# Patient Record
Sex: FEMALE | URBAN - METROPOLITAN AREA
[De-identification: names, ages, dates, MRNs, and addresses within clinical notes are randomized per-mention and may not be internally consistent; named-entity substitution may affect disease eponyms.]

---

## 2020-09-08 ASSESSMENT — MIFFLIN-ST. JEOR: SCORE: 1734.61

## 2020-09-09 ENCOUNTER — SURGERY - HEALTHEAST (OUTPATIENT)
Dept: SURGERY | Facility: HOSPITAL | Age: 75
End: 2020-09-09

## 2020-09-09 ENCOUNTER — ANESTHESIA - HEALTHEAST (OUTPATIENT)
Dept: SURGERY | Facility: HOSPITAL | Age: 75
End: 2020-09-09

## 2020-09-10 ASSESSMENT — MIFFLIN-ST. JEOR
SCORE: 2025.37
SCORE: 2025.37

## 2020-09-11 ENCOUNTER — COMMUNICATION - HEALTHEAST (OUTPATIENT)
Dept: SCHEDULING | Facility: CLINIC | Age: 75
End: 2020-09-11

## 2020-09-13 ASSESSMENT — MIFFLIN-ST. JEOR: SCORE: 1848.01

## 2020-09-14 ASSESSMENT — MIFFLIN-ST. JEOR: SCORE: 1832.59

## 2020-09-15 ENCOUNTER — OFFICE VISIT - HEALTHEAST (OUTPATIENT)
Dept: GERIATRICS | Facility: CLINIC | Age: 75
End: 2020-09-15

## 2020-09-15 DIAGNOSIS — I26.99 ACUTE PULMONARY EMBOLISM, UNSPECIFIED PULMONARY EMBOLISM TYPE, UNSPECIFIED WHETHER ACUTE COR PULMONALE PRESENT (H): ICD-10-CM

## 2020-09-15 DIAGNOSIS — D62 ACUTE BLOOD LOSS ANEMIA: ICD-10-CM

## 2020-09-15 DIAGNOSIS — S72.401D CLOSED FRACTURE OF DISTAL END OF RIGHT FEMUR WITH ROUTINE HEALING, UNSPECIFIED FRACTURE MORPHOLOGY, SUBSEQUENT ENCOUNTER: ICD-10-CM

## 2020-09-15 DIAGNOSIS — R52 PAIN MANAGEMENT: ICD-10-CM

## 2020-09-16 ENCOUNTER — RECORDS - HEALTHEAST (OUTPATIENT)
Dept: LAB | Facility: CLINIC | Age: 75
End: 2020-09-16

## 2020-09-16 ENCOUNTER — COMMUNICATION - HEALTHEAST (OUTPATIENT)
Dept: GERIATRICS | Facility: CLINIC | Age: 75
End: 2020-09-16

## 2020-09-17 ENCOUNTER — OFFICE VISIT - HEALTHEAST (OUTPATIENT)
Dept: GERIATRICS | Facility: CLINIC | Age: 75
End: 2020-09-17

## 2020-09-17 DIAGNOSIS — I26.99 PULMONARY EMBOLISM, UNSPECIFIED CHRONICITY, UNSPECIFIED PULMONARY EMBOLISM TYPE, UNSPECIFIED WHETHER ACUTE COR PULMONALE PRESENT (H): ICD-10-CM

## 2020-09-17 DIAGNOSIS — D50.0 BLOOD LOSS ANEMIA: ICD-10-CM

## 2020-09-17 DIAGNOSIS — R00.0 TACHYCARDIA: ICD-10-CM

## 2020-09-17 DIAGNOSIS — S72.401D CLOSED FRACTURE OF DISTAL END OF RIGHT FEMUR WITH ROUTINE HEALING, UNSPECIFIED FRACTURE MORPHOLOGY, SUBSEQUENT ENCOUNTER: ICD-10-CM

## 2020-09-17 LAB
ERYTHROCYTE [DISTWIDTH] IN BLOOD BY AUTOMATED COUNT: 16.9 % (ref 11–14.5)
HCT VFR BLD AUTO: 25.3 % (ref 35–47)
HGB BLD-MCNC: 8.1 G/DL (ref 12–16)
MCH RBC QN AUTO: 30.6 PG (ref 27–34)
MCHC RBC AUTO-ENTMCNC: 32 G/DL (ref 32–36)
MCV RBC AUTO: 96 FL (ref 80–100)
PLATELET # BLD AUTO: 461 THOU/UL (ref 140–440)
PMV BLD AUTO: 10.6 FL (ref 8.5–12.5)
RBC # BLD AUTO: 2.65 MILL/UL (ref 3.8–5.4)
WBC: 12.2 THOU/UL (ref 4–11)

## 2020-09-21 ENCOUNTER — OFFICE VISIT - HEALTHEAST (OUTPATIENT)
Dept: GERIATRICS | Facility: CLINIC | Age: 75
End: 2020-09-21

## 2020-09-21 DIAGNOSIS — D62 ACUTE BLOOD LOSS ANEMIA: ICD-10-CM

## 2020-09-21 DIAGNOSIS — I15.9 SECONDARY HYPERTENSION: ICD-10-CM

## 2020-09-21 DIAGNOSIS — S72.401D CLOSED FRACTURE OF DISTAL END OF RIGHT FEMUR WITH ROUTINE HEALING, UNSPECIFIED FRACTURE MORPHOLOGY, SUBSEQUENT ENCOUNTER: ICD-10-CM

## 2020-09-21 RX ORDER — METOPROLOL TARTRATE 25 MG/1
100 TABLET, FILM COATED ORAL 2 TIMES DAILY
Status: SHIPPED | COMMUNITY
Start: 2020-09-21

## 2020-09-22 ENCOUNTER — OFFICE VISIT - HEALTHEAST (OUTPATIENT)
Dept: GERIATRICS | Facility: CLINIC | Age: 75
End: 2020-09-22

## 2020-09-22 DIAGNOSIS — R00.0 TACHYCARDIA: ICD-10-CM

## 2020-09-22 DIAGNOSIS — D62 ACUTE BLOOD LOSS ANEMIA: ICD-10-CM

## 2020-09-22 DIAGNOSIS — I26.99 PULMONARY EMBOLISM, OTHER, UNSPECIFIED CHRONICITY, UNSPECIFIED WHETHER ACUTE COR PULMONALE PRESENT (H): ICD-10-CM

## 2020-09-22 DIAGNOSIS — S72.401D CLOSED FRACTURE OF DISTAL END OF RIGHT FEMUR WITH ROUTINE HEALING, UNSPECIFIED FRACTURE MORPHOLOGY, SUBSEQUENT ENCOUNTER: ICD-10-CM

## 2020-09-24 ENCOUNTER — OFFICE VISIT - HEALTHEAST (OUTPATIENT)
Dept: GERIATRICS | Facility: CLINIC | Age: 75
End: 2020-09-24

## 2020-09-24 DIAGNOSIS — D62 ACUTE BLOOD LOSS ANEMIA: ICD-10-CM

## 2020-09-24 DIAGNOSIS — S72.401D CLOSED FRACTURE OF DISTAL END OF RIGHT FEMUR WITH ROUTINE HEALING, UNSPECIFIED FRACTURE MORPHOLOGY, SUBSEQUENT ENCOUNTER: ICD-10-CM

## 2020-09-24 DIAGNOSIS — R00.0 TACHYCARDIA: ICD-10-CM

## 2020-09-24 DIAGNOSIS — I26.99 ACUTE PULMONARY EMBOLISM, UNSPECIFIED PULMONARY EMBOLISM TYPE, UNSPECIFIED WHETHER ACUTE COR PULMONALE PRESENT (H): ICD-10-CM

## 2020-09-29 ENCOUNTER — OFFICE VISIT - HEALTHEAST (OUTPATIENT)
Dept: GERIATRICS | Facility: CLINIC | Age: 75
End: 2020-09-29

## 2020-09-29 DIAGNOSIS — S72.401D CLOSED FRACTURE OF DISTAL END OF RIGHT FEMUR WITH ROUTINE HEALING, UNSPECIFIED FRACTURE MORPHOLOGY, SUBSEQUENT ENCOUNTER: ICD-10-CM

## 2020-09-29 DIAGNOSIS — R00.0 TACHYCARDIA: ICD-10-CM

## 2020-09-29 DIAGNOSIS — D62 ACUTE BLOOD LOSS ANEMIA: ICD-10-CM

## 2020-09-29 DIAGNOSIS — I10 ESSENTIAL HYPERTENSION: ICD-10-CM

## 2020-10-02 ENCOUNTER — OFFICE VISIT - HEALTHEAST (OUTPATIENT)
Dept: GERIATRICS | Facility: CLINIC | Age: 75
End: 2020-10-02

## 2020-10-02 DIAGNOSIS — S72.351A: ICD-10-CM

## 2020-10-02 DIAGNOSIS — I15.9 SECONDARY HYPERTENSION: ICD-10-CM

## 2020-10-02 DIAGNOSIS — R52 PAIN MANAGEMENT: ICD-10-CM

## 2020-10-02 RX ORDER — ACETAMINOPHEN 500 MG
1000 TABLET ORAL EVERY 8 HOURS PRN
Status: SHIPPED | COMMUNITY
Start: 2020-10-02

## 2020-10-02 RX ORDER — AMLODIPINE BESYLATE 2.5 MG/1
5 TABLET ORAL DAILY
Status: SHIPPED | COMMUNITY
Start: 2020-10-02

## 2020-10-05 ENCOUNTER — COMMUNICATION - HEALTHEAST (OUTPATIENT)
Dept: GERIATRICS | Facility: CLINIC | Age: 75
End: 2020-10-05

## 2020-10-06 ENCOUNTER — OFFICE VISIT - HEALTHEAST (OUTPATIENT)
Dept: GERIATRICS | Facility: CLINIC | Age: 75
End: 2020-10-06

## 2020-10-06 DIAGNOSIS — R00.0 TACHYCARDIA: ICD-10-CM

## 2020-10-06 DIAGNOSIS — D62 ACUTE BLOOD LOSS ANEMIA: ICD-10-CM

## 2020-10-06 DIAGNOSIS — I10 ESSENTIAL HYPERTENSION: ICD-10-CM

## 2020-10-06 DIAGNOSIS — S72.401D CLOSED FRACTURE OF DISTAL END OF RIGHT FEMUR WITH ROUTINE HEALING, UNSPECIFIED FRACTURE MORPHOLOGY, SUBSEQUENT ENCOUNTER: ICD-10-CM

## 2020-10-12 ENCOUNTER — RECORDS - HEALTHEAST (OUTPATIENT)
Dept: LAB | Facility: CLINIC | Age: 75
End: 2020-10-12

## 2020-10-12 ENCOUNTER — OFFICE VISIT - HEALTHEAST (OUTPATIENT)
Dept: GERIATRICS | Facility: CLINIC | Age: 75
End: 2020-10-12

## 2020-10-12 DIAGNOSIS — I15.9 SECONDARY HYPERTENSION: ICD-10-CM

## 2020-10-12 DIAGNOSIS — N93.9 VAGINAL BLEEDING: ICD-10-CM

## 2020-10-13 LAB
ALBUMIN UR-MCNC: ABNORMAL MG/DL
APPEARANCE UR: ABNORMAL
BACTERIA #/AREA URNS HPF: ABNORMAL HPF
BACTERIA SPEC CULT: NO GROWTH
BILIRUB UR QL STRIP: NEGATIVE
COLOR UR AUTO: YELLOW
ERYTHROCYTE [DISTWIDTH] IN BLOOD BY AUTOMATED COUNT: 14.8 % (ref 11–14.5)
GLUCOSE UR STRIP-MCNC: NEGATIVE MG/DL
HCT VFR BLD AUTO: 35.1 % (ref 35–47)
HGB BLD-MCNC: 11.1 G/DL (ref 12–16)
HGB UR QL STRIP: NEGATIVE
KETONES UR STRIP-MCNC: NEGATIVE MG/DL
LEUKOCYTE ESTERASE UR QL STRIP: ABNORMAL
MCH RBC QN AUTO: 30.8 PG (ref 27–34)
MCHC RBC AUTO-ENTMCNC: 31.6 G/DL (ref 32–36)
MCV RBC AUTO: 98 FL (ref 80–100)
MUCOUS THREADS #/AREA URNS LPF: ABNORMAL LPF
NITRATE UR QL: NEGATIVE
PH UR STRIP: 5.5 [PH] (ref 4.5–8)
PLATELET # BLD AUTO: 262 THOU/UL (ref 140–440)
PMV BLD AUTO: 10.6 FL (ref 8.5–12.5)
RBC # BLD AUTO: 3.6 MILL/UL (ref 3.8–5.4)
RBC #/AREA URNS AUTO: ABNORMAL HPF
SP GR UR STRIP: 1.02 (ref 1–1.03)
SQUAMOUS #/AREA URNS AUTO: ABNORMAL LPF
TRICHOMONAS #/AREA URNS HPF: ABNORMAL /[HPF]
UROBILINOGEN UR STRIP-ACNC: ABNORMAL
WBC #/AREA URNS AUTO: ABNORMAL HPF
WBC CLUMPS #/AREA URNS HPF: PRESENT /[HPF]
WBC: 6 THOU/UL (ref 4–11)

## 2020-10-15 ENCOUNTER — OFFICE VISIT - HEALTHEAST (OUTPATIENT)
Dept: GERIATRICS | Facility: CLINIC | Age: 75
End: 2020-10-15

## 2020-10-15 DIAGNOSIS — S72.401D CLOSED FRACTURE OF DISTAL END OF RIGHT FEMUR WITH ROUTINE HEALING, UNSPECIFIED FRACTURE MORPHOLOGY, SUBSEQUENT ENCOUNTER: ICD-10-CM

## 2020-10-15 DIAGNOSIS — I26.99 OTHER ACUTE PULMONARY EMBOLISM, UNSPECIFIED WHETHER ACUTE COR PULMONALE PRESENT (H): ICD-10-CM

## 2020-10-15 DIAGNOSIS — I10 ESSENTIAL HYPERTENSION: ICD-10-CM

## 2020-10-15 DIAGNOSIS — D62 ACUTE BLOOD LOSS ANEMIA: ICD-10-CM

## 2020-10-20 ENCOUNTER — OFFICE VISIT - HEALTHEAST (OUTPATIENT)
Dept: GERIATRICS | Facility: CLINIC | Age: 75
End: 2020-10-20

## 2020-10-20 DIAGNOSIS — I26.99 OTHER ACUTE PULMONARY EMBOLISM, UNSPECIFIED WHETHER ACUTE COR PULMONALE PRESENT (H): ICD-10-CM

## 2020-10-20 DIAGNOSIS — I10 ESSENTIAL HYPERTENSION: ICD-10-CM

## 2020-10-20 DIAGNOSIS — D62 ACUTE BLOOD LOSS ANEMIA: ICD-10-CM

## 2020-10-20 DIAGNOSIS — S72.401D CLOSED FRACTURE OF DISTAL END OF RIGHT FEMUR WITH ROUTINE HEALING, UNSPECIFIED FRACTURE MORPHOLOGY, SUBSEQUENT ENCOUNTER: ICD-10-CM

## 2020-10-28 ENCOUNTER — OFFICE VISIT - HEALTHEAST (OUTPATIENT)
Dept: GERIATRICS | Facility: CLINIC | Age: 75
End: 2020-10-28

## 2020-10-28 DIAGNOSIS — I15.9 SECONDARY HYPERTENSION: ICD-10-CM

## 2020-10-28 DIAGNOSIS — S72.401D CLOSED FRACTURE OF DISTAL END OF RIGHT FEMUR WITH ROUTINE HEALING, UNSPECIFIED FRACTURE MORPHOLOGY, SUBSEQUENT ENCOUNTER: ICD-10-CM

## 2020-10-28 DIAGNOSIS — R52 PAIN MANAGEMENT: ICD-10-CM

## 2020-11-10 ENCOUNTER — OFFICE VISIT - HEALTHEAST (OUTPATIENT)
Dept: GERIATRICS | Facility: CLINIC | Age: 75
End: 2020-11-10

## 2020-11-10 DIAGNOSIS — D64.9 ANEMIA, UNSPECIFIED TYPE: ICD-10-CM

## 2020-11-10 DIAGNOSIS — I26.99 PULMONARY EMBOLISM, OTHER, UNSPECIFIED CHRONICITY, UNSPECIFIED WHETHER ACUTE COR PULMONALE PRESENT (H): ICD-10-CM

## 2020-11-10 DIAGNOSIS — S72.401D CLOSED FRACTURE OF DISTAL END OF RIGHT FEMUR WITH ROUTINE HEALING, UNSPECIFIED FRACTURE MORPHOLOGY, SUBSEQUENT ENCOUNTER: ICD-10-CM

## 2020-11-10 DIAGNOSIS — I10 ESSENTIAL HYPERTENSION: ICD-10-CM

## 2020-11-12 ENCOUNTER — OFFICE VISIT - HEALTHEAST (OUTPATIENT)
Dept: GERIATRICS | Facility: CLINIC | Age: 75
End: 2020-11-12

## 2020-11-12 DIAGNOSIS — I26.99 POSTOPERATIVE PULMONARY EMBOLISM, SUBSEQUENT ENCOUNTER (H): ICD-10-CM

## 2020-11-12 DIAGNOSIS — T81.718D POSTOPERATIVE PULMONARY EMBOLISM, SUBSEQUENT ENCOUNTER (H): ICD-10-CM

## 2020-11-12 DIAGNOSIS — I10 ESSENTIAL HYPERTENSION: ICD-10-CM

## 2020-11-12 DIAGNOSIS — S72.401D CLOSED FRACTURE OF DISTAL END OF RIGHT FEMUR WITH ROUTINE HEALING, UNSPECIFIED FRACTURE MORPHOLOGY, SUBSEQUENT ENCOUNTER: ICD-10-CM

## 2020-11-12 DIAGNOSIS — D62 ACUTE BLOOD LOSS ANEMIA: ICD-10-CM

## 2020-11-18 ENCOUNTER — OFFICE VISIT - HEALTHEAST (OUTPATIENT)
Dept: GERIATRICS | Facility: CLINIC | Age: 75
End: 2020-11-18

## 2020-11-18 DIAGNOSIS — I15.9 SECONDARY HYPERTENSION: ICD-10-CM

## 2020-11-18 DIAGNOSIS — D62 ACUTE BLOOD LOSS ANEMIA: ICD-10-CM

## 2020-11-18 DIAGNOSIS — S72.401D CLOSED FRACTURE OF DISTAL END OF RIGHT FEMUR WITH ROUTINE HEALING, UNSPECIFIED FRACTURE MORPHOLOGY, SUBSEQUENT ENCOUNTER: ICD-10-CM

## 2020-11-20 ENCOUNTER — AMBULATORY - HEALTHEAST (OUTPATIENT)
Dept: GERIATRICS | Facility: CLINIC | Age: 75
End: 2020-11-20

## 2020-11-23 ENCOUNTER — COMMUNICATION - HEALTHEAST (OUTPATIENT)
Dept: SCHEDULING | Facility: CLINIC | Age: 75
End: 2020-11-23

## 2020-11-25 ENCOUNTER — RECORDS - HEALTHEAST (OUTPATIENT)
Dept: ADMINISTRATIVE | Facility: OTHER | Age: 75
End: 2020-11-25

## 2020-12-04 ENCOUNTER — AMBULATORY - HEALTHEAST (OUTPATIENT)
Dept: OTHER | Facility: CLINIC | Age: 75
End: 2020-12-04

## 2020-12-04 ENCOUNTER — DOCUMENTATION ONLY (OUTPATIENT)
Dept: OTHER | Facility: CLINIC | Age: 75
End: 2020-12-04

## 2020-12-22 ENCOUNTER — OFFICE VISIT - HEALTHEAST (OUTPATIENT)
Dept: INTERNAL MEDICINE | Facility: CLINIC | Age: 75
End: 2020-12-22

## 2020-12-22 DIAGNOSIS — I10 ESSENTIAL HYPERTENSION: ICD-10-CM

## 2020-12-22 DIAGNOSIS — S72.401D CLOSED FRACTURE OF DISTAL END OF RIGHT FEMUR WITH ROUTINE HEALING, UNSPECIFIED FRACTURE MORPHOLOGY, SUBSEQUENT ENCOUNTER: ICD-10-CM

## 2020-12-22 RX ORDER — AMLODIPINE BESYLATE 5 MG/1
5 TABLET ORAL DAILY
Qty: 90 TABLET | Refills: 1 | Status: SHIPPED | OUTPATIENT
Start: 2020-12-22

## 2020-12-22 RX ORDER — METOPROLOL TARTRATE 100 MG
100 TABLET ORAL 2 TIMES DAILY
Qty: 180 TABLET | Refills: 1 | Status: SHIPPED | OUTPATIENT
Start: 2020-12-22

## 2020-12-30 ENCOUNTER — RECORDS - HEALTHEAST (OUTPATIENT)
Dept: ADMINISTRATIVE | Facility: OTHER | Age: 75
End: 2020-12-30

## 2020-12-31 ENCOUNTER — COMMUNICATION - HEALTHEAST (OUTPATIENT)
Dept: ADMINISTRATIVE | Facility: CLINIC | Age: 75
End: 2020-12-31

## 2021-01-06 ENCOUNTER — COMMUNICATION - HEALTHEAST (OUTPATIENT)
Dept: FAMILY MEDICINE | Facility: CLINIC | Age: 76
End: 2021-01-06

## 2021-02-19 ENCOUNTER — AMBULATORY - HEALTHEAST (OUTPATIENT)
Dept: NURSING | Facility: CLINIC | Age: 76
End: 2021-02-19

## 2021-03-12 ENCOUNTER — AMBULATORY - HEALTHEAST (OUTPATIENT)
Dept: NURSING | Facility: CLINIC | Age: 76
End: 2021-03-12

## 2021-04-08 ENCOUNTER — COMMUNICATION - HEALTHEAST (OUTPATIENT)
Dept: INTERNAL MEDICINE | Facility: CLINIC | Age: 76
End: 2021-04-08

## 2021-04-13 ENCOUNTER — COMMUNICATION - HEALTHEAST (OUTPATIENT)
Dept: ADMINISTRATIVE | Facility: CLINIC | Age: 76
End: 2021-04-13

## 2021-04-13 ENCOUNTER — COMMUNICATION - HEALTHEAST (OUTPATIENT)
Dept: INTERNAL MEDICINE | Facility: CLINIC | Age: 76
End: 2021-04-13

## 2021-06-05 VITALS — HEIGHT: 72 IN | BODY MASS INDEX: 37.05 KG/M2 | WEIGHT: 273.5 LBS

## 2021-06-05 VITALS
WEIGHT: 264.1 LBS | DIASTOLIC BLOOD PRESSURE: 95 MMHG | SYSTOLIC BLOOD PRESSURE: 157 MMHG | OXYGEN SATURATION: 94 % | RESPIRATION RATE: 18 BRPM | HEART RATE: 77 BPM | TEMPERATURE: 98.9 F | BODY MASS INDEX: 35.82 KG/M2

## 2021-06-05 VITALS
DIASTOLIC BLOOD PRESSURE: 100 MMHG | WEIGHT: 261.5 LBS | HEART RATE: 92 BPM | SYSTOLIC BLOOD PRESSURE: 178 MMHG | TEMPERATURE: 98.4 F | RESPIRATION RATE: 18 BRPM | OXYGEN SATURATION: 94 % | BODY MASS INDEX: 35.47 KG/M2

## 2021-06-05 VITALS
SYSTOLIC BLOOD PRESSURE: 161 MMHG | OXYGEN SATURATION: 93 % | RESPIRATION RATE: 18 BRPM | WEIGHT: 244 LBS | DIASTOLIC BLOOD PRESSURE: 94 MMHG | TEMPERATURE: 98.4 F | BODY MASS INDEX: 33.09 KG/M2 | HEART RATE: 70 BPM

## 2021-06-05 VITALS
OXYGEN SATURATION: 95 % | BODY MASS INDEX: 33.38 KG/M2 | RESPIRATION RATE: 18 BRPM | TEMPERATURE: 98.2 F | HEART RATE: 74 BPM | SYSTOLIC BLOOD PRESSURE: 158 MMHG | WEIGHT: 246.1 LBS | DIASTOLIC BLOOD PRESSURE: 82 MMHG

## 2021-06-05 VITALS
HEART RATE: 78 BPM | TEMPERATURE: 98.5 F | DIASTOLIC BLOOD PRESSURE: 90 MMHG | SYSTOLIC BLOOD PRESSURE: 163 MMHG | BODY MASS INDEX: 33.96 KG/M2 | OXYGEN SATURATION: 95 % | RESPIRATION RATE: 18 BRPM | WEIGHT: 250.4 LBS

## 2021-06-11 NOTE — PROGRESS NOTES
[facility]    Facility:   [facility] [[id number]]   Code Status: FULL CODE       Chief Complaint   Patient presents with     Follow Up     TCU 9/24/2020. Right distal femur fracture.        TCU HPI:   Jovana is a 75 y.o. female who fell on 9/8/2020, tripped at home and presented to the hospital with a right distal femur fracture. Hospital info as partially excerpted below.     75 y.o. old female with no significant past medical history who presented after mechanical  fall at home. Right femur xray and CT right knee showed comminuted distal femoral shaft fracture 0n 9/8/2020.  Admitted for further management.     Acute bilateral PE: bilateral segmental/subsegmental, w/o RV strain, Hemodynamically stable, , troponins negative.  TTE EF 75%, nml rv size/function although not well visualized, no PHTN, no hemodynamically significant valve disease.  Precipitant likely DVT RLE in setting of right femur fracture.  -Hgb stable x24hrs with change from IV Heparin to Xarelto.  Would plan 6 months anticoagulation for provoked PE   -wean off O2 as able at TCU     Acute comminuted distal femoral shaft fracture:  POD#6 ORIF right distal femur fracture using retrograde intramedullary nail.  EBL 200ml intra-op.  Orthopedic surgery follow-up as advised.  PT/OT.       Acute postop right leg pain: controlled on current regimen.     Acute hypoxic respiratory failure:  Stable/imrpoving.  2/2 bilateral PE and hypervolemia as well as not using I.S. and likely some atelectasis. COVID 9/8 and 9/14 negative.   -prn O2 and wean as able  -aggressive bronchial hygiene, I.S.     ABLA:  Hgb 15.3 (9/8)->9.7 (9/11)->7.9 9/12.  8.1 on 9/13 post 1U PRBC.  Hgb 7.7 on discharge.  CBC at TCU as advised.  Overall stable over past 36hrs.  Hemodynamically stable.     Sinus tachycardia: improved/stable.  multifactorial and reactive from acute medical issues.      Uncomplicated UTI: UC pansensitive e.  coli  -complete cefdinir as prescribed     Constipation  -miralax, senna, mobilize.      Leukocytosis: reactive. 13.8 on d/c.  procalcitonin negative. Afebrile.  Being treated for UTI on sensitive regimen, no PNA symptoms, negative COVID 9/8 and 9/14.  I presume it's related to known PE and most probable a RLE DVT which is already actively being treated.  No other s/s of infection elsewhere.  CBC at TCU as advised.  Aggressive I.S. for atelectasis.     Overall stabilized and discharged to TCU on 9/15/2020 for PT, OT, nursing cares, medical management and monitoring.       Today:  She is doing pretty well. Pain is improving. BPs treated now with metoprolol, also tachycardia, now in to low 100s, improved. No sx per her report. EKG with sinus tach. Had echo in hospital. No HAs, palpitations. She is tired but not short of breath. No hypoxia. Restricted to TTWB. She is on scheduled tylenol and has prn oxycodone. Post op had bilateral PEs per CT on 9//10/2020. On xarelto. She did have ABLA which required transfusion. Hgb upon hospital DC on 9/15/2020 was 7.7 and is 8.1 follow up in TCU. She is on iron. She denies dizziness. Appetite is pretty good. No diarrhea or constipation. No urinary sx. No fever or cough.She will see ortho next week on 9/29/2020 for follow up.      Past Medical History:  No significant medical history.   Denies HTN, DM, CA, lung disease.      Medications:  Current Outpatient Medications   Medication Sig     acetaminophen (TYLENOL) 500 MG tablet Take 1,000 mg by mouth every 8 (eight) hours as needed for pain.     amLODIPine (NORVASC) 2.5 MG tablet Take 2.5 mg by mouth daily.     bisacodyL (DULCOLAX) 10 mg suppository Insert 1 suppository (10 mg total) into the rectum daily as needed.     cyclobenzaprine (FLEXERIL) 5 MG tablet Take 1 tablet (5 mg total) by mouth 3 (three) times a day as needed for muscle spasms.     ferrous sulfate 325 (65 FE) MG tablet Take 1 tablet by mouth daily with breakfast.      melatonin 3 mg Tab tablet Take 1 tablet (3 mg total) by mouth at bedtime as needed.     metoprolol tartrate (LOPRESSOR) 25 MG tablet Take 25 mg by mouth 2 (two) times a day.     oxyCODONE (ROXICODONE) 5 MG immediate release tablet Take 1-2 tablets (5-10 mg total) by mouth every 4 (four) hours as needed (5mg for pain 5-7.  10mg for pain 8-10).     polyethylene glycol (MIRALAX) 17 gram packet Take 1 packet (17 g total) by mouth daily. Hold for 3 or more loose stools per day     rivaroxaban ANTICOAGULANT (XARELTO) 15 mg tablet Take 1 tablet (15 mg total) by mouth 2 (two) times a day with meals for 20 days.     [START ON 10/5/2020] rivaroxaban ANTICOAGULANT (XARELTO) 20 mg tablet Take 1 tablet (20 mg total) by mouth daily with supper.     senna-docusate (PERICOLACE) 8.6-50 mg tablet Take 2 tablets by mouth 2 (two) times a day. Hold for 3 or more loose stools per day       Physical Exam:   Note: COVID-19 pandemic precautions in place. Physical exam performed with social distancing considerations.  General: Patient is alert pleasant female, no distress.   Vitals: /70, Temp 97.5, Pulse 94, RR 18, O2 sat 95%RA.  HEENT: Head is NCAT. Eyes show no injection or icterus. Nares negative. Oropharynx well hydrated.  Neck: Supple. No tenderness or adenopathy. No JVD.  Lungs: Non labored respirations.  Abdomen: Soft, no tenderness. No guarding rebound or rigidity.  : Deferred.  Extremities: Mod right LE swelling.   Musculoskeletal: Swelling right LE.   Skin: Surgical incisions prox thigh, distal femur with staples, ecchymoses.   Psych: Mood appears good.      Labs:  Component      Latest Ref Rng & Units 9/8/2020 9/9/2020 9/10/2020 9/11/2020   WBC      4.0 - 11.0 thou/uL 17.6 (H) 10.2 8.2 11.7 (H)   RBC      3.80 - 5.40 mill/uL 5.09 4.18 3.38 (L) 3.24 (L)   Hemoglobin      12.0 - 16.0 g/dL 15.3 12.4 10.2 (L) 9.7 (L)   Hematocrit      35.0 - 47.0 % 45.8 37.4 31.6 (L) 30.0 (L)   MCV      80 - 100 fL 90 90 94 93   MCH      27.0  - 34.0 pg 30.1 29.7 30.2 29.9   MCHC      32.0 - 36.0 g/dL 33.4 33.2 32.3 32.3   RDW      11.0 - 14.5 % 13.6 13.8 14.0 14.0   Platelets      140 - 440 thou/uL 323 285 194 202   MPV      8.5 - 12.5 fL 10.5 10.3 10.4 10.9     Component      Latest Ref Rng & Units 9/12/2020 9/13/2020 9/14/2020 9/15/2020   WBC      4.0 - 11.0 thou/uL 11.3 (H) 11.6 (H) 13.0 (H) 13.8 (H)   RBC      3.80 - 5.40 mill/uL 2.63 (L) 2.65 (L) 2.65 (L) 2.55 (L)   Hemoglobin      12.0 - 16.0 g/dL 7.9 (L) 8.1 (L) 8.0 (L) 7.7 (L)   Hematocrit      35.0 - 47.0 % 24.2 (L) 24.1 (L) 24.4 (L) 23.7 (L)   MCV      80 - 100 fL 92 91 92 93   MCH      27.0 - 34.0 pg 30.0 30.6 30.2 30.2   MCHC      32.0 - 36.0 g/dL 32.6 33.6 32.8 32.5   RDW      11.0 - 14.5 % 14.0 14.2 14.6 (H) 15.0 (H)   Platelets      140 - 440 thou/uL 219 247 280 309   MPV      8.5 - 12.5 fL 11.0 10.7 10.3 10.3       Results for orders placed or performed during the hospital encounter of 09/08/20   Basic Metabolic Panel   Result Value Ref Range    Sodium 138 136 - 145 mmol/L    Potassium 4.0 3.5 - 5.0 mmol/L    Chloride 102 98 - 107 mmol/L    CO2 26 22 - 31 mmol/L    Anion Gap, Calculation 10 5 - 18 mmol/L    Glucose 110 70 - 125 mg/dL    Calcium 8.0 (L) 8.5 - 10.5 mg/dL    BUN 19 8 - 28 mg/dL    Creatinine 0.60 0.60 - 1.10 mg/dL    GFR MDRD Af Amer >60 >60 mL/min/1.73m2    GFR MDRD Non Af Amer >60 >60 mL/min/1.73m2     Lab Results   Component Value Date    HGB 8.1 (L) 09/17/2020     Lab Results   Component Value Date    WBC 12.2 (H) 09/17/2020    HGB 8.1 (L) 09/17/2020    HCT 25.3 (L) 09/17/2020    MCV 96 09/17/2020     (H) 09/17/2020         Assessment/Plan:  1. Right distal femur fracture. Sustained in a fall on 9/8/2020. S/p ORIF using retrograde intramedullary nail on 9/9/2020. TTWB. Therapy as able. Follow up with ortho next week.   2. Bilateral acute PE. Post op. No evidence right heart strain. On xarelto x 6 months.   3. ABLA. Sec to surgery. Received transfusion on  9/13/2020. DC from hosp at 7.7. Recheck 9/17/20 was 8.1. Continue iron.  4. Tachycardia. EKG shows sinus tach. Improved since starting metoprolol for BP and HR. Will continue to titrate dose.   5. HTN. As noted, on metoprolol. Continue to monitor BPs.   6. Hypoxic resp failure. Multifactorial. Post op PEs, hypervolemia, atelectasis. Has prn oxygen though on room air and sats ok for now. Encourage IS, monitor closely. Stable.   7. UTI. UC grew 10-50 K E coli. Will complete course of Cefdinir today. No current urinary sx. No fever or abdominal pain.   8. Leukocytosis. Presented to ED with wbc over 17, last at 12.2 today. Likely reactive, multifactorial. Treated for UTI but no other sign infection.            Electronically signed by: Kiara Mares MD

## 2021-06-11 NOTE — ANESTHESIA POSTPROCEDURE EVALUATION
Patient: Jovana Clinton  Procedure(s):  OPEN REDUCTION INTERNAL FIXATION, FRACTURE, FEMUR (Right)  Anesthesia type: spinal    Patient location: PACU  Last vitals:   Vitals Value Taken Time   /79 9/9/2020  7:00 PM   Temp 37.4  C (99.4  F) 9/9/2020  6:20 PM   Pulse 101 9/9/2020  6:59 PM   Resp 14 9/9/2020  6:59 PM   SpO2 93 % 9/9/2020  6:59 PM   Vitals shown include unvalidated device data.  Post vital signs: stable  Level of consciousness: awake and responds to simple questions  Post-anesthesia pain: pain controlled  Post-anesthesia nausea and vomiting: no  Pulmonary: unassisted, return to baseline  Cardiovascular: stable and blood pressure at baseline  Hydration: adequate  Anesthetic events: no    QCDR Measures:  ASA# 11 - Eryn-op Cardiac Arrest: ASA11B - Patient did NOT experience unanticipated cardiac arrest  ASA# 12 - Eryn-op Mortality Rate: ASA12B - Patient did NOT die  ASA# 13 - PACU Re-Intubation Rate: ASA13B - Patient did NOT require a new airway mgmt  ASA# 10 - Composite Anes Safety: ASA10A - No serious adverse event    Additional Notes:

## 2021-06-11 NOTE — PROGRESS NOTES
[facility]    Facility:   [facility] [[id number]]   Code Status: FULL CODE       Chief Complaint   Patient presents with     Follow Up     TCU 9/29/2020        TCU HPI:   Jovana is a 75 y.o. female who fell on 9/8/2020, tripped at home and presented to the hospital with a right distal femur fracture. Hospital info as partially excerpted below.     75 y.o. old female with no significant past medical history who presented after mechanical  fall at home. Right femur xray and CT right knee showed comminuted distal femoral shaft fracture 0n 9/8/2020.  Admitted for further management.     Acute bilateral PE: bilateral segmental/subsegmental, w/o RV strain, Hemodynamically stable, , troponins negative.  TTE EF 75%, nml rv size/function although not well visualized, no PHTN, no hemodynamically significant valve disease.  Precipitant likely DVT RLE in setting of right femur fracture.  -Hgb stable x24hrs with change from IV Heparin to Xarelto.  Would plan 6 months anticoagulation for provoked PE   -wean off O2 as able at TCU     Acute comminuted distal femoral shaft fracture:  POD#6 ORIF right distal femur fracture using retrograde intramedullary nail.  EBL 200ml intra-op.  Orthopedic surgery follow-up as advised.  PT/OT.       Acute postop right leg pain: controlled on current regimen.     Acute hypoxic respiratory failure:  Stable/imrpoving.  2/2 bilateral PE and hypervolemia as well as not using I.S. and likely some atelectasis. COVID 9/8 and 9/14 negative.   -prn O2 and wean as able  -aggressive bronchial hygiene, I.S.     ABLA:  Hgb 15.3 (9/8)->9.7 (9/11)->7.9 9/12.  8.1 on 9/13 post 1U PRBC.  Hgb 7.7 on discharge.  CBC at TCU as advised.  Overall stable over past 36hrs.  Hemodynamically stable.     Sinus tachycardia: improved/stable.  multifactorial and reactive from acute medical issues.      Uncomplicated UTI: UC pansensitive e. coli  -complete cefdinir as  prescribed     Constipation  -miralax, senna, mobilize.      Leukocytosis: reactive. 13.8 on d/c.  procalcitonin negative. Afebrile.  Being treated for UTI on sensitive regimen, no PNA symptoms, negative COVID 9/8 and 9/14.  I presume it's related to known PE and most probable a RLE DVT which is already actively being treated.  No other s/s of infection elsewhere.  CBC at TCU as advised.  Aggressive I.S. for atelectasis.     Overall stabilized and discharged to TCU on 9/15/2020 for PT, OT, nursing cares, medical management and monitoring.       Today:  She saw ortho today, staples removed, continue TTWB, RTC 2 weeks. Pain under adequate control, less swelling. Metoprolol for HTN and elevated HRs increased to 50 two times a day, starting today. Continue to titrate dose for BP control. No HAs, palpitations. She is not short of breath. No hypoxia. On xarelto due to post op PE. She did have ABLA which required transfusion. Hgb upon hospital DC on 9/15/2020 was 7.7 and is 8.1 follow up in TCU. She is on iron. She denies dizziness. Appetite is pretty good. No diarrhea or constipation. No urinary sx. No fever or cough.      Past Medical History:  No significant medical history.   Denies HTN, DM, CA, lung disease.      Medications:  Current Outpatient Medications   Medication Sig     acetaminophen (TYLENOL) 500 MG tablet Take 1,000 mg by mouth every 8 (eight) hours as needed for pain.          bisacodyL (DULCOLAX) 10 mg suppository Insert 1 suppository (10 mg total) into the rectum daily as needed.     cyclobenzaprine (FLEXERIL) 5 MG tablet Take 1 tablet (5 mg total) by mouth 3 (three) times a day as needed for muscle spasms.     ferrous sulfate 325 (65 FE) MG tablet Take 1 tablet by mouth daily with breakfast.     melatonin 3 mg Tab tablet Take 1 tablet (3 mg total) by mouth at bedtime as needed.     metoprolol tartrate (LOPRESSOR) 50 MG tablet Take 50 mg by mouth 2 (two) times a day.     oxyCODONE (ROXICODONE) 5 MG  immediate release tablet Take 1-2 tablets (5-10 mg total) by mouth every 4 (four) hours as needed (5mg for pain 5-7.  10mg for pain 8-10).     polyethylene glycol (MIRALAX) 17 gram packet Take 1 packet (17 g total) by mouth daily. Hold for 3 or more loose stools per day     rivaroxaban ANTICOAGULANT (XARELTO) 15 mg tablet Take 1 tablet (15 mg total) by mouth 2 (two) times a day with meals for 20 days.     [START ON 10/5/2020] rivaroxaban ANTICOAGULANT (XARELTO) 20 mg tablet Take 1 tablet (20 mg total) by mouth daily with supper.     senna-docusate (PERICOLACE) 8.6-50 mg tablet Take 2 tablets by mouth 2 (two) times a day. Hold for 3 or more loose stools per day       Physical Exam:   Note: COVID-19 pandemic precautions in place. Physical exam performed with social distancing considerations.  General: Patient is alert pleasant female, no distress.   Vitals: /90, Temp 98.6, Pulse 102, RR 20, O2 sat 94%RA.  HEENT: Head is NCAT. Eyes show no injection or icterus. Nares negative. Oropharynx well hydrated.  Neck: Supple. No tenderness or adenopathy. No JVD.  Lungs: Non labored respirations.  Abdomen: Soft, no tenderness. No guarding rebound or rigidity.  : Deferred.  Extremities: Mild to mod right LE swelling, improved.   Musculoskeletal: Swelling right LE.   Skin: Surgical incisions prox thigh, distal femur.  Psych: Mood appears good.      Labs:  Component      Latest Ref Rng & Units 9/8/2020 9/9/2020 9/10/2020 9/11/2020   WBC      4.0 - 11.0 thou/uL 17.6 (H) 10.2 8.2 11.7 (H)   RBC      3.80 - 5.40 mill/uL 5.09 4.18 3.38 (L) 3.24 (L)   Hemoglobin      12.0 - 16.0 g/dL 15.3 12.4 10.2 (L) 9.7 (L)   Hematocrit      35.0 - 47.0 % 45.8 37.4 31.6 (L) 30.0 (L)   MCV      80 - 100 fL 90 90 94 93   MCH      27.0 - 34.0 pg 30.1 29.7 30.2 29.9   MCHC      32.0 - 36.0 g/dL 33.4 33.2 32.3 32.3   RDW      11.0 - 14.5 % 13.6 13.8 14.0 14.0   Platelets      140 - 440 thou/uL 323 285 194 202   MPV      8.5 - 12.5 fL 10.5 10.3  10.4 10.9     Component      Latest Ref Rng & Units 9/12/2020 9/13/2020 9/14/2020 9/15/2020   WBC      4.0 - 11.0 thou/uL 11.3 (H) 11.6 (H) 13.0 (H) 13.8 (H)   RBC      3.80 - 5.40 mill/uL 2.63 (L) 2.65 (L) 2.65 (L) 2.55 (L)   Hemoglobin      12.0 - 16.0 g/dL 7.9 (L) 8.1 (L) 8.0 (L) 7.7 (L)   Hematocrit      35.0 - 47.0 % 24.2 (L) 24.1 (L) 24.4 (L) 23.7 (L)   MCV      80 - 100 fL 92 91 92 93   MCH      27.0 - 34.0 pg 30.0 30.6 30.2 30.2   MCHC      32.0 - 36.0 g/dL 32.6 33.6 32.8 32.5   RDW      11.0 - 14.5 % 14.0 14.2 14.6 (H) 15.0 (H)   Platelets      140 - 440 thou/uL 219 247 280 309   MPV      8.5 - 12.5 fL 11.0 10.7 10.3 10.3       Results for orders placed or performed during the hospital encounter of 09/08/20   Basic Metabolic Panel   Result Value Ref Range    Sodium 138 136 - 145 mmol/L    Potassium 4.0 3.5 - 5.0 mmol/L    Chloride 102 98 - 107 mmol/L    CO2 26 22 - 31 mmol/L    Anion Gap, Calculation 10 5 - 18 mmol/L    Glucose 110 70 - 125 mg/dL    Calcium 8.0 (L) 8.5 - 10.5 mg/dL    BUN 19 8 - 28 mg/dL    Creatinine 0.60 0.60 - 1.10 mg/dL    GFR MDRD Af Amer >60 >60 mL/min/1.73m2    GFR MDRD Non Af Amer >60 >60 mL/min/1.73m2     Lab Results   Component Value Date    HGB 8.1 (L) 09/17/2020     Lab Results   Component Value Date    WBC 12.2 (H) 09/17/2020    HGB 8.1 (L) 09/17/2020    HCT 25.3 (L) 09/17/2020    MCV 96 09/17/2020     (H) 09/17/2020         Assessment/Plan:  1. Right distal femur fracture. Sustained in a fall on 9/8/2020. S/p ORIF using retrograde intramedullary nail on 9/9/2020. TTWB. Ortho visit today, staples removed, RTC 2 weeks.   2. Bilateral post op acute PE. On xarelto x 6 months.   3. ABLA. Sec to surgery. Received transfusion on 9/13/2020. DC from hosp at 7.7. Recheck 9/17/20 was 8.1. Continue iron.  4. HTN. On metoprolol, dose just increased.  5. Tachycardia. EKG shows sinus tach. Improved since starting metoprolol for BP and HR. Will continue to titrate dose.  Asymptomatic.        Electronically signed by: Kiara Mares MD

## 2021-06-11 NOTE — ANESTHESIA CARE TRANSFER NOTE
Last vitals:   Vitals:    09/09/20 1820   BP: 133/60   Pulse: (!) 109   Resp: 12   Temp: 37.4  C (99.4  F)   SpO2: 95%     Patient's level of consciousness is awake and alert.   Spontaneous respirations: yes  Maintains airway independently: yes  Dentition unchanged: yes  Oropharynx: oropharynx clear of all foreign objects    QCDR Measures:  ASA# 20 - Surgical Safety Checklist: WHO surgical safety checklist completed prior to induction    PQRS# 430 - Adult PONV Prevention: 4558F - Pt received => 2 anti-emetic agents (different classes) preop & intraop  ASA# 8 - Peds PONV Prevention: NA - Not pediatric patient, not GA or 2 or more risk factors NOT present  PQRS# 424 - Eryn-op Temp Management: 4559F - At least one body temp DOCUMENTED => 35.5C or 95.9F within required timeframe  PQRS# 426 - PACU Transfer Protocol: - Transfer of care checklist used  ASA# 14 - Acute Post-op Pain: ASA14B - Patient did NOT experience pain >= 7 out of 10

## 2021-06-11 NOTE — ANESTHESIA PREPROCEDURE EVALUATION
Anesthesia Evaluation      Patient summary reviewed   No history of anesthetic complications     Airway   Mallampati: II  Neck ROM: full   Pulmonary - negative ROS and normal exam                          Cardiovascular - negative ROS and normal exam  Exercise tolerance: > or = 4 METS   Neuro/Psych - negative ROS     Endo/Other    (+) obesity,      GI/Hepatic/Renal - negative ROS      Other findings: Denies significant medical issues. K 4.2, GFR>60, Hg 12.4.  Covid test 9/8 pending.      Dental    (+) edentulous                       Anesthesia Plan  Planned anesthetic: spinal    ASA 2     Anesthetic plan and risks discussed with: patient and child/children  Anesthesia plan special considerations: antiemetics,   Post-op plan: routine recovery

## 2021-06-11 NOTE — TELEPHONE ENCOUNTER
[facility] Nurse Triage Telephone Note      Provider: HECTOR Ruelas  Facility: [facility] Type: TCU    Caller: Princess  Call Back Number:  [phone]    Allergies: Patient has no known allergies.    Reason for call: Nurse reporting that patient is running tachycardic.  She had tachycardia in the hospital and was worked up with ECG's and an Echocardiogram.  Earlier today, HR was 129.  At 1530, HR was 128 via the machine and 132 apically.  At 1648, HR was 130 apically and was regular and BP was 138/78.  No c/o shortness of breath and pain was 2/10.  Not currently receiving any cardiac meds.       Verbal Order/Direction given by Provider: Give Metoprolol 25mg x 1 dose.  Hold for SBP <110.  Update MD tomorrow.      Provider giving order: HECTOR Ruelas    Verbal order given to: Princess Dior RN

## 2021-06-11 NOTE — PROGRESS NOTES
[facility]    Facility:   [facility] [[id number]]   Code Status: FULL CODE       Chief Complaint   Patient presents with     Follow Up     TCU 9/17/2020. Right distal femur fx s/p surgery.        TCU HPI:   Jovana is a 75 y.o. female who fell on 9/8/2020, tripped at home and presented to the hospital with a right distal femur fracture. Hospital info as partially excerpted below.     75 y.o. old female with no significant past medical history who presented after mechanical  fall at home. Right femur xray and CT right knee showed comminuted distal femoral shaft fracture 0n 9/8/2020.  Admitted for further management.     Acute bilateral PE: bilateral segmental/subsegmental, w/o RV strain, Hemodynamically stable, , troponins negative.  TTE EF 75%, nml rv size/function although not well visualized, no PHTN, no hemodynamically significant valve disease.  Precipitant likely DVT RLE in setting of right femur fracture.  -Hgb stable x24hrs with change from IV Heparin to Xarelto.  Would plan 6 months anticoagulation for provoked PE   -wean off O2 as able at TCU     Acute comminuted distal femoral shaft fracture:  POD#6 ORIF right distal femur fracture using retrograde intramedullary nail.  EBL 200ml intra-op.  Orthopedic surgery follow-up as advised.  PT/OT.       Acute postop right leg pain: controlled on current regimen.     Acute hypoxic respiratory failure:  Stable/imrpoving.  2/2 bilateral PE and hypervolemia as well as not using I.S. and likely some atelectasis. COVID 9/8 and 9/14 negative.   -prn O2 and wean as able  -aggressive bronchial hygiene, I.S.     ABLA:  Hgb 15.3 (9/8)->9.7 (9/11)->7.9 9/12.  8.1 on 9/13 post 1U PRBC.  Hgb 7.7 on discharge.  CBC at TCU as advised.  Overall stable over past 36hrs.  Hemodynamically stable.     Sinus tachycardia: improved/stable.  multifactorial and reactive from acute medical issues.      Uncomplicated UTI: UC pansensitive e.  coli  -complete cefdinir as prescribed     Constipation  -miralax, senna, mobilize.      Leukocytosis: reactive. 13.8 on d/c.  procalcitonin negative. Afebrile.  Being treated for UTI on sensitive regimen, no PNA symptoms, negative COVID 9/8 and 9/14.  I presume it's related to known PE and most probable a RLE DVT which is already actively being treated.  No other s/s of infection elsewhere.  CBC at TCU as advised.  Aggressive I.S. for atelectasis.     Overall stabilized and discharged to TCU on 9/15/2020 for PT, OT, nursing cares, medical management and monitoring.       Today:  Continues to run tachycardia, some into 130s, often low 100s. completely asymptomatic per her report. She has also had some elevated BPs, states no previous hx of HTN. No HAs, palpitations. She is tired but not short of breath. No hypoxia. Had work up including ekgs and echo in hospital, sinus tachycardia noted. Restricted to TTWB. Spasms in legs improved. She is on scheduled tylenol and has prn oxycodone. It is noted she had significantly elevated BPs when she presented to the ED, systolic over 200, contributors of acute pain and stress. Post op had bilateral PEs per CT on 9//10/2020. Sinus tachycardia also noted throughout hospital stay, noted to be improving and felt to be multifactorial due to medical events. She did have ABLA which required transfusion. Hgb upon hospital DC on 9/15/2020 was 7.7 and is 8.1 today. She will be started on iron for 30 days. She denies dizziness. Appetite is pretty good. No diarrhea or constipation. No urinary sx. No fever or cough.      Past Medical History:  No significant medical history.   Denies HTN, DM, CA, lung disease.  Lab Results   Component Value Date    HGB 8.1 (L) 09/17/2020           Medications:  Current Outpatient Medications   Medication Sig     acetaminophen (TYLENOL) 500 MG tablet Take 2 tablets (1,000 mg total) by mouth 3 (three) times a day for 7 days. Then three times a day PRN pain      bisacodyL (DULCOLAX) 10 mg suppository Insert 1 suppository (10 mg total) into the rectum daily as needed.     cyclobenzaprine (FLEXERIL) 5 MG tablet Take 1 tablet (5 mg total) by mouth 3 (three) times a day as needed for muscle spasms.     melatonin 3 mg Tab tablet Take 1 tablet (3 mg total) by mouth at bedtime as needed.     oxyCODONE (ROXICODONE) 5 MG immediate release tablet Take 1-2 tablets (5-10 mg total) by mouth every 4 (four) hours as needed (5mg for pain 5-7.  10mg for pain 8-10).     polyethylene glycol (MIRALAX) 17 gram packet Take 1 packet (17 g total) by mouth daily. Hold for 3 or more loose stools per day     rivaroxaban ANTICOAGULANT (XARELTO) 15 mg tablet Take 1 tablet (15 mg total) by mouth 2 (two) times a day with meals for 20 days.     [START ON 10/5/2020] rivaroxaban ANTICOAGULANT (XARELTO) 20 mg tablet Take 1 tablet (20 mg total) by mouth daily with supper.     senna-docusate (PERICOLACE) 8.6-50 mg tablet Take 2 tablets by mouth 2 (two) times a day. Hold for 3 or more loose stools per day       Physical Exam:   Note: COVID-19 pandemic precautions in place. Physical exam performed with social distancing considerations.  General: Patient is alert pleasant female, no distress.   Vitals: /70, Temp 98.4, Pulse 92, RR 18, O2 sat 94%RA.  HEENT: Head is NCAT. Eyes show no injection or icterus. Nares negative. Oropharynx well hydrated.  Neck: Supple. No tenderness or adenopathy. No JVD.  Lungs: Non labored respirations.  Abdomen: Soft, no tenderness. No guarding rebound or rigidity.  : Deferred.  Extremities: Mod right LE swelling.   Musculoskeletal: Swelling right LE.   Skin: Surgical incisions prox thigh, distal femur with staples, ecchymoses.   Psych: Mood appears good.      Labs:  Component      Latest Ref Rng & Units 9/8/2020 9/9/2020 9/10/2020 9/11/2020   WBC      4.0 - 11.0 thou/uL 17.6 (H) 10.2 8.2 11.7 (H)   RBC      3.80 - 5.40 mill/uL 5.09 4.18 3.38 (L) 3.24 (L)   Hemoglobin      12.0  - 16.0 g/dL 15.3 12.4 10.2 (L) 9.7 (L)   Hematocrit      35.0 - 47.0 % 45.8 37.4 31.6 (L) 30.0 (L)   MCV      80 - 100 fL 90 90 94 93   MCH      27.0 - 34.0 pg 30.1 29.7 30.2 29.9   MCHC      32.0 - 36.0 g/dL 33.4 33.2 32.3 32.3   RDW      11.0 - 14.5 % 13.6 13.8 14.0 14.0   Platelets      140 - 440 thou/uL 323 285 194 202   MPV      8.5 - 12.5 fL 10.5 10.3 10.4 10.9     Component      Latest Ref Rng & Units 9/12/2020 9/13/2020 9/14/2020 9/15/2020   WBC      4.0 - 11.0 thou/uL 11.3 (H) 11.6 (H) 13.0 (H) 13.8 (H)   RBC      3.80 - 5.40 mill/uL 2.63 (L) 2.65 (L) 2.65 (L) 2.55 (L)   Hemoglobin      12.0 - 16.0 g/dL 7.9 (L) 8.1 (L) 8.0 (L) 7.7 (L)   Hematocrit      35.0 - 47.0 % 24.2 (L) 24.1 (L) 24.4 (L) 23.7 (L)   MCV      80 - 100 fL 92 91 92 93   MCH      27.0 - 34.0 pg 30.0 30.6 30.2 30.2   MCHC      32.0 - 36.0 g/dL 32.6 33.6 32.8 32.5   RDW      11.0 - 14.5 % 14.0 14.2 14.6 (H) 15.0 (H)   Platelets      140 - 440 thou/uL 219 247 280 309   MPV      8.5 - 12.5 fL 11.0 10.7 10.3 10.3       Results for orders placed or performed during the hospital encounter of 09/08/20   Basic Metabolic Panel   Result Value Ref Range    Sodium 138 136 - 145 mmol/L    Potassium 4.0 3.5 - 5.0 mmol/L    Chloride 102 98 - 107 mmol/L    CO2 26 22 - 31 mmol/L    Anion Gap, Calculation 10 5 - 18 mmol/L    Glucose 110 70 - 125 mg/dL    Calcium 8.0 (L) 8.5 - 10.5 mg/dL    BUN 19 8 - 28 mg/dL    Creatinine 0.60 0.60 - 1.10 mg/dL    GFR MDRD Af Amer >60 >60 mL/min/1.73m2    GFR MDRD Non Af Amer >60 >60 mL/min/1.73m2     Lab Results   Component Value Date    HGB 8.1 (L) 09/17/2020     Lab Results   Component Value Date    WBC 12.2 (H) 09/17/2020    HGB 8.1 (L) 09/17/2020    HCT 25.3 (L) 09/17/2020    MCV 96 09/17/2020     (H) 09/17/2020         Assessment/Plan:  1. Right distal femur fracture. Sustained in a fall on 9/8/2020. S/p ORIF using retrograde intramedullary nail on 9/9/2020. TTWB. Therapy as able. Follow up with ortho.   2.  Bilateral acute PE. Post op. No evidence right heart strain. On xarelto x 6 months.   3. ABLA. Sec to surgery. Received transfusion on 9/13/2020. DC from hosp at 7.7. Recheck today is 8.1. Start iron for 30 days, ferrous sulfate 325 once daily.  4. Tachycardia. EKG ordered for today. She was noted to have this in the hospital with work up there. It has been persistent. Will start low dose metoprolol and titrate as needed and as BPs allow. She has been running elevated BPs as well with no known prior dx of HTN.   5. Hypoxic resp failure. Multifactorial. Post op PEs, hypervolemia, atelectasis. Has prn oxygen though on room air and sats ok for now. Encourage IS, monitor closely.   6. UTI. UC grew 10-50 K E coli. Will complete course of Cefdinir today. No current urinary sx. No fever or abdominal pain.   7. Leukocytosis. Presented to ED with wbc over 17, last at 12.2 today. Likely reactive, multifactorial. Treated for UTI but no other sign infection.  8. Elevated BPs. When she was in ED, BPs 212/108, 190/105, contributors acute pain, stress. Metoprolol started in TCU as noted.      Total time spent was greater than 35 minutes, more than 50% spent in face-to-face counseling regarding tachycardia, hospital work up, obtain EKG in TCU. Starting metoprolol, frequent monitoring of BP and HR. Consider cardiology follow up. Also continue TTWB for distal femur fracture, follow up with ortho needed. Continue anticoagulation for PE.          Electronically signed by: Kiara Mares MD

## 2021-06-11 NOTE — PROGRESS NOTES
[facility]    Facility:   [facility] [[id number]]   Code Status: DNR      CHIEF COMPLAINT/REASON FOR VISIT:  Chief Complaint   Patient presents with     Review Of Multiple Medical Conditions     hyperrtension, F/U right femur fracture.        HISTORY:      HPI: Jovana is a 75 y.o. female undergoing physical and occupational therapy at [facility]   She was hospitalized from 9/8-9/15/20. with no significant past medical history who presented after mechanical  fall at home. Right femur xray and CT right knee showed comminuted distal femoral shaft fracture. She underwent  ORIF right distal femur fracture using retrograde intramedullary nail. She also developed acute bilateral PE's and is on Xarelto.     Today she is seen to review multiple medical issues and to establish care,  Denies fever, chills,Denies any chest pain,headaches,lightheadedness, dizziness,   shortness of breath, or cough. Appetite is good. Denies any GERD symptoms.   Denies any difficulty with swallowing,Denies any abdominal pain, constipation or loose stools. No insomnia. No active bleeding. BP continue to be elevated and her metoprolol was increased to 25 mg two times a day.    Past Medical History:   Diagnosis Date     Secondary hypertension 9/21/2020             Family History   Problem Relation Age of Onset     Heart disease Neg Hx      Social History     Socioeconomic History     Marital status:      Spouse name: Not on file     Number of children: Not on file     Years of education: Not on file     Highest education level: Not on file   Occupational History     Not on file   Social Needs     Financial resource strain: Not on file     Food insecurity     Worry: Not on file     Inability: Not on file     Transportation needs     Medical: Not on file     Non-medical: Not on file   Tobacco Use     Smoking status: Former Smoker     Smokeless tobacco: Never Used   Substance and  Sexual Activity     Alcohol use: Yes     Alcohol/week: 1.0 standard drinks     Types: 1 Shots of liquor per week     Frequency: 2-4 times a month     Drinks per session: 1 or 2     Binge frequency: Never     Drug use: Never     Sexual activity: Not on file   Lifestyle     Physical activity     Days per week: Not on file     Minutes per session: Not on file     Stress: Not on file   Relationships     Social connections     Talks on phone: Not on file     Gets together: Not on file     Attends Faith service: Not on file     Active member of club or organization: Not on file     Attends meetings of clubs or organizations: Not on file     Relationship status: Not on file     Intimate partner violence     Fear of current or ex partner: Not on file     Emotionally abused: Not on file     Physically abused: Not on file     Forced sexual activity: Not on file   Other Topics Concern     Incontinent Not Asked     Toileting independently Not Asked     Cognitive impairment Not Asked     Vision impairment Not Asked     Hearing impairment Not Asked     Dentures Not Asked   Social History Narrative     Not on file         Review of Systems   Constitutional: Negative for activity change, appetite change, fatigue and fever.   HENT: Negative for congestion.    Respiratory: Negative for cough, shortness of breath and wheezing.    Cardiovascular: Negative for chest pain and leg swelling.   Gastrointestinal: Negative for abdominal distention, abdominal pain, constipation, diarrhea and nausea.   Genitourinary: Negative for dysuria.   Musculoskeletal: Positive for arthralgias. Negative for back pain.   Skin: Positive for wound. Negative for color change.   Neurological: Negative for dizziness.   Psychiatric/Behavioral: Negative for agitation, behavioral problems and confusion.       Vitals:    09/21/20 1038   BP: (!) 178/100   Pulse: 92   Resp: 18   Temp: 98.4  F (36.9  C)   SpO2: 94%   Weight: (!) 261 lb 8 oz (118.6 kg)        Physical Exam  Constitutional:       Appearance: She is well-developed.   HENT:      Head: Normocephalic.   Eyes:      Conjunctiva/sclera: Conjunctivae normal.   Neck:      Musculoskeletal: Normal range of motion.   Cardiovascular:      Rate and Rhythm: Normal rate and regular rhythm.      Heart sounds: Normal heart sounds. No murmur.   Pulmonary:      Effort: No respiratory distress.      Breath sounds: Normal breath sounds. No wheezing or rales.   Abdominal:      General: Bowel sounds are normal. There is no distension.      Palpations: Abdomen is soft.      Tenderness: There is no abdominal tenderness.   Musculoskeletal:      Right lower leg: Edema present.      Comments: Decreased RLE   Skin:     General: Skin is warm.      Comments: 3 hip incisions right lateral thigh stapled C/D/I    Neurological:      Mental Status: She is alert and oriented to person, place, and time.   Psychiatric:         Behavior: Behavior normal.           LABS:   Recent Results (from the past 240 hour(s))   Crossmatch   Result Value Ref Range    Crossmatch Compatible     Unit Type O Neg     Unit Number [id number]     Status Transfused     Component Red Blood Cells     PRODUCT CODE F7930I53     Issue Date and Time [id number]     Blood Type 9500     CODING SYSTEM [id number]    Anti-Xa Heparin Level   Result Value Ref Range    Anti-Xa Heparin Assay 0.46 0.30 - 0.70 IU/mL   HM2(CBC w/o Differential)   Result Value Ref Range    WBC 11.6 (H) 4.0 - 11.0 thou/uL    RBC 2.65 (L) 3.80 - 5.40 mill/uL    Hemoglobin 8.1 (L) 12.0 - 16.0 g/dL    Hematocrit 24.1 (L) 35.0 - 47.0 %    MCV 91 80 - 100 fL    MCH 30.6 27.0 - 34.0 pg    MCHC 33.6 32.0 - 36.0 g/dL    RDW 14.2 11.0 - 14.5 %    Platelets 247 140 - 440 thou/uL    MPV 10.7 8.5 - 12.5 fL   Basic Metabolic Panel   Result Value Ref Range    Sodium 138 136 - 145 mmol/L    Potassium 4.1 3.5 - 5.0 mmol/L    Chloride 104 98 - 107 mmol/L    CO2 26 22 - 31 mmol/L    Anion Gap, Calculation 8 5 -  18 mmol/L    Glucose 108 70 - 125 mg/dL    Calcium 8.0 (L) 8.5 - 10.5 mg/dL    BUN 17 8 - 28 mg/dL    Creatinine 0.67 0.60 - 1.10 mg/dL    GFR MDRD Af Amer >60 >60 mL/min/1.73m2    GFR MDRD Non Af Amer >60 >60 mL/min/1.73m2   Anti-Xa Heparin Level   Result Value Ref Range    Anti-Xa Heparin Assay 0.51 0.30 - 0.70 IU/mL   HM2(CBC w/o Differential)   Result Value Ref Range    WBC 13.0 (H) 4.0 - 11.0 thou/uL    RBC 2.65 (L) 3.80 - 5.40 mill/uL    Hemoglobin 8.0 (L) 12.0 - 16.0 g/dL    Hematocrit 24.4 (L) 35.0 - 47.0 %    MCV 92 80 - 100 fL    MCH 30.2 27.0 - 34.0 pg    MCHC 32.8 32.0 - 36.0 g/dL    RDW 14.6 (H) 11.0 - 14.5 %    Platelets 280 140 - 440 thou/uL    MPV 10.3 8.5 - 12.5 fL   Basic Metabolic Panel   Result Value Ref Range    Sodium 138 136 - 145 mmol/L    Potassium 3.8 3.5 - 5.0 mmol/L    Chloride 103 98 - 107 mmol/L    CO2 26 22 - 31 mmol/L    Anion Gap, Calculation 9 5 - 18 mmol/L    Glucose 114 70 - 125 mg/dL    Calcium 8.1 (L) 8.5 - 10.5 mg/dL    BUN 20 8 - 28 mg/dL    Creatinine 0.67 0.60 - 1.10 mg/dL    GFR MDRD Af Amer >60 >60 mL/min/1.73m2    GFR MDRD Non Af Amer >60 >60 mL/min/1.73m2   Magnesium   Result Value Ref Range    Magnesium 2.2 1.8 - 2.6 mg/dL   Procalcitonin   Result Value Ref Range    Procalcitonin 0.34 0.00 - 0.49 ng/mL   COVID-19 Virus PCR MRF    Specimen: Respiratory   Result Value Ref Range    COVID-19 VIRUS SPECIMEN SOURCE Nasopharyngeal     2019-nCOV       Test received-See reflex to IDDL test SARS CoV2 (COVID-19) Virus RT-PCR   SARS-CoV-2 (COVID-19) RT-PCR-IDDL    Specimen: Respiratory   Result Value Ref Range    SARS-CoV-2 Virus Specimen Source Nasopharyngeal     SARS-CoV-2 PCR Result NEGATIVE     SARS-COV-2 PCR COMMENT       Testing was performed using the Xpert Xpress SARS-CoV-2 Assay on the [employer]   Lactic Acid   Result Value Ref Range    Lactic Acid 0.7 0.7 - 2.0 mmol/L   HM2(CBC w/o Differential)   Result Value Ref Range    WBC 13.8 (H) 4.0 - 11.0 thou/uL    RBC 2.55 (L)  3.80 - 5.40 mill/uL    Hemoglobin 7.7 (L) 12.0 - 16.0 g/dL    Hematocrit 23.7 (L) 35.0 - 47.0 %    MCV 93 80 - 100 fL    MCH 30.2 27.0 - 34.0 pg    MCHC 32.5 32.0 - 36.0 g/dL    RDW 15.0 (H) 11.0 - 14.5 %    Platelets 309 140 - 440 thou/uL    MPV 10.3 8.5 - 12.5 fL   Basic Metabolic Panel   Result Value Ref Range    Sodium 138 136 - 145 mmol/L    Potassium 4.0 3.5 - 5.0 mmol/L    Chloride 102 98 - 107 mmol/L    CO2 26 22 - 31 mmol/L    Anion Gap, Calculation 10 5 - 18 mmol/L    Glucose 110 70 - 125 mg/dL    Calcium 8.0 (L) 8.5 - 10.5 mg/dL    BUN 19 8 - 28 mg/dL    Creatinine 0.60 0.60 - 1.10 mg/dL    GFR MDRD Af Amer >60 >60 mL/min/1.73m2    GFR MDRD Non Af Amer >60 >60 mL/min/1.73m2   Magnesium   Result Value Ref Range    Magnesium 2.1 1.8 - 2.6 mg/dL   HM2(CBC w/o Differential)   Result Value Ref Range    WBC 12.2 (H) 4.0 - 11.0 thou/uL    RBC 2.65 (L) 3.80 - 5.40 mill/uL    Hemoglobin 8.1 (L) 12.0 - 16.0 g/dL    Hematocrit 25.3 (L) 35.0 - 47.0 %    MCV 96 80 - 100 fL    MCH 30.6 27.0 - 34.0 pg    MCHC 32.0 32.0 - 36.0 g/dL    RDW 16.9 (H) 11.0 - 14.5 %    Platelets 461 (H) 140 - 440 thou/uL    MPV 10.6 8.5 - 12.5 fL     Current Outpatient Medications   Medication Sig     acetaminophen (TYLENOL) 500 MG tablet Take 2 tablets (1,000 mg total) by mouth 3 (three) times a day for 7 days. Then three times a day PRN pain     bisacodyL (DULCOLAX) 10 mg suppository Insert 1 suppository (10 mg total) into the rectum daily as needed.     cyclobenzaprine (FLEXERIL) 5 MG tablet Take 1 tablet (5 mg total) by mouth 3 (three) times a day as needed for muscle spasms.     ferrous sulfate 325 (65 FE) MG tablet Take 1 tablet by mouth daily with breakfast.     melatonin 3 mg Tab tablet Take 1 tablet (3 mg total) by mouth at bedtime as needed.     metoprolol tartrate (LOPRESSOR) 25 MG tablet Take 25 mg by mouth 2 (two) times a day.     oxyCODONE (ROXICODONE) 5 MG immediate release tablet Take 1-2 tablets (5-10 mg total) by mouth every  4 (four) hours as needed (5mg for pain 5-7.  10mg for pain 8-10).     polyethylene glycol (MIRALAX) 17 gram packet Take 1 packet (17 g total) by mouth daily. Hold for 3 or more loose stools per day     rivaroxaban ANTICOAGULANT (XARELTO) 15 mg tablet Take 1 tablet (15 mg total) by mouth 2 (two) times a day with meals for 20 days.     [START ON 10/5/2020] rivaroxaban ANTICOAGULANT (XARELTO) 20 mg tablet Take 1 tablet (20 mg total) by mouth daily with supper.     senna-docusate (PERICOLACE) 8.6-50 mg tablet Take 2 tablets by mouth 2 (two) times a day. Hold for 3 or more loose stools per day     ASSESSMENT:      ICD-10-CM    1. Closed fracture of distal end of right femur with routine healing, unspecified fracture morphology, subsequent encounter  S72.401D    2. Acute blood loss anemia  D62    3. Secondary hypertension  I15.9        PLAN:    ORIF right femur PT OT, pain control, at least 20-minute increments twice daily, follow-up with orthopedics as scheduled    Acute blood loss anemia hemoglobin 8.1 on 9/17/2020, on ferrous sulfate, monitor labs    Hypertension increase metoprolol tartrate to 25 mg twice daily anticoagulation on    Anticoagulation on Xarelto    Insomnia continue melatonin    Pain management on extra strength Tylenol and oxycodone as needed    Electronically signed by: Jess Presley CNP

## 2021-06-11 NOTE — PROGRESS NOTES
[facility]    Facility:   [facility] [[id number]]   Code Status: FULL CODE       Chief Complaint   Patient presents with     Follow Up     TCU 9/22/2020. Right distal femur fracture. HTN.       TCU HPI:   Jovana is a 75 y.o. female who fell on 9/8/2020, tripped at home and presented to the hospital with a right distal femur fracture. Hospital info as partially excerpted below.     75 y.o. old female with no significant past medical history who presented after mechanical  fall at home. Right femur xray and CT right knee showed comminuted distal femoral shaft fracture 0n 9/8/2020.  Admitted for further management.     Acute bilateral PE: bilateral segmental/subsegmental, w/o RV strain, Hemodynamically stable, , troponins negative.  TTE EF 75%, nml rv size/function although not well visualized, no PHTN, no hemodynamically significant valve disease.  Precipitant likely DVT RLE in setting of right femur fracture.  -Hgb stable x24hrs with change from IV Heparin to Xarelto.  Would plan 6 months anticoagulation for provoked PE   -wean off O2 as able at TCU     Acute comminuted distal femoral shaft fracture:  POD#6 ORIF right distal femur fracture using retrograde intramedullary nail.  EBL 200ml intra-op.  Orthopedic surgery follow-up as advised.  PT/OT.       Acute postop right leg pain: controlled on current regimen.     Acute hypoxic respiratory failure:  Stable/imrpoving.  2/2 bilateral PE and hypervolemia as well as not using I.S. and likely some atelectasis. COVID 9/8 and 9/14 negative.   -prn O2 and wean as able  -aggressive bronchial hygiene, I.S.     ABLA:  Hgb 15.3 (9/8)->9.7 (9/11)->7.9 9/12.  8.1 on 9/13 post 1U PRBC.  Hgb 7.7 on discharge.  CBC at TCU as advised.  Overall stable over past 36hrs.  Hemodynamically stable.     Sinus tachycardia: improved/stable.  multifactorial and reactive from acute medical issues.      Uncomplicated UTI: UC pansensitive e.  coli  -complete cefdinir as prescribed     Constipation  -miralax, senna, mobilize.      Leukocytosis: reactive. 13.8 on d/c.  procalcitonin negative. Afebrile.  Being treated for UTI on sensitive regimen, no PNA symptoms, negative COVID 9/8 and 9/14.  I presume it's related to known PE and most probable a RLE DVT which is already actively being treated.  No other s/s of infection elsewhere.  CBC at TCU as advised.  Aggressive I.S. for atelectasis.     Overall stabilized and discharged to TCU on 9/15/2020 for PT, OT, nursing cares, medical management and monitoring.       Today:  BPs have been elevated, had been noted in the hospital. Patient with no prior hx of HTN. Discussed today, she has not been to a doctor in many years, at least over 20. Suspect previously undiagnosed and thus untreated HTN due to no contact with medical system. She stated that she recalls when she had teeth pulled at the dentist in preparation for dentures, she was told her BP was high but she does not recall the number. Her pulses running 90s to 107. EKG done in TCU showed sinus tachycardia. She had EKGs and echo in hospital. She was started on metoprolol and yesterday dose increased. She will need further titration for BP control. Consider cardiology referral if persistent tachycardia. She has not had any symptoms. She denies dizziness. Appetite is pretty good. No diarrhea or constipation. No urinary sx. No fever or cough. No HAs, palpitations. She is tired but not short of breath. No hypoxia. Regarding her distal femur fracture, she is TTWB, pain under better control, less spasms. She is on scheduled tylenol and has prn oxycodone. She did have ABLA which required transfusion. Hgb upon hospital DC on 9/15/2020 was 7.7 and up to 8.1 in TCU on 9/17/20, started on iron.       Past Medical History:  No significant medical history.   Denies HTN, DM, CA, lung disease.      Medications:  Current Outpatient Medications   Medication Sig      bisacodyL (DULCOLAX) 10 mg suppository Insert 1 suppository (10 mg total) into the rectum daily as needed.     cyclobenzaprine (FLEXERIL) 5 MG tablet Take 1 tablet (5 mg total) by mouth 3 (three) times a day as needed for muscle spasms.     ferrous sulfate 325 (65 FE) MG tablet Take 1 tablet by mouth daily with breakfast.     melatonin 3 mg Tab tablet Take 1 tablet (3 mg total) by mouth at bedtime as needed.     metoprolol tartrate (LOPRESSOR) 25 MG tablet Take 25 mg by mouth 2 (two) times a day.     oxyCODONE (ROXICODONE) 5 MG immediate release tablet Take 1-2 tablets (5-10 mg total) by mouth every 4 (four) hours as needed (5mg for pain 5-7.  10mg for pain 8-10).     polyethylene glycol (MIRALAX) 17 gram packet Take 1 packet (17 g total) by mouth daily. Hold for 3 or more loose stools per day     rivaroxaban ANTICOAGULANT (XARELTO) 15 mg tablet Take 1 tablet (15 mg total) by mouth 2 (two) times a day with meals for 20 days.     [START ON 10/5/2020] rivaroxaban ANTICOAGULANT (XARELTO) 20 mg tablet Take 1 tablet (20 mg total) by mouth daily with supper.     senna-docusate (PERICOLACE) 8.6-50 mg tablet Take 2 tablets by mouth 2 (two) times a day. Hold for 3 or more loose stools per day       Physical Exam:   Note: COVID-19 pandemic precautions in place. Physical exam performed with social distancing considerations.  General: Patient is alert pleasant female, no distress.   Vitals: /98, 150/74, Temp 98.3, Pulse 94, RR 18, O2 sat 98%RA.  HEENT: Head is NCAT. Eyes show no injection or icterus. Nares negative. Oropharynx well hydrated.  Neck: Supple. No tenderness or adenopathy. No JVD.  Lungs: Non labored respirations.  : Deferred.  Extremities: Mod right LE swelling, improved.   Musculoskeletal: Swelling right LE, improved.   Skin: Surgical incisions prox thigh, distal femur with staples, ecchymoses.   Psych: Mood appears good.      Labs:  Component      Latest Ref Rng & Units 9/8/2020 9/9/2020 9/10/2020  9/11/2020   WBC      4.0 - 11.0 thou/uL 17.6 (H) 10.2 8.2 11.7 (H)   RBC      3.80 - 5.40 mill/uL 5.09 4.18 3.38 (L) 3.24 (L)   Hemoglobin      12.0 - 16.0 g/dL 15.3 12.4 10.2 (L) 9.7 (L)   Hematocrit      35.0 - 47.0 % 45.8 37.4 31.6 (L) 30.0 (L)   MCV      80 - 100 fL 90 90 94 93   MCH      27.0 - 34.0 pg 30.1 29.7 30.2 29.9   MCHC      32.0 - 36.0 g/dL 33.4 33.2 32.3 32.3   RDW      11.0 - 14.5 % 13.6 13.8 14.0 14.0   Platelets      140 - 440 thou/uL 323 285 194 202   MPV      8.5 - 12.5 fL 10.5 10.3 10.4 10.9     Component      Latest Ref Rng & Units 9/12/2020 9/13/2020 9/14/2020 9/15/2020   WBC      4.0 - 11.0 thou/uL 11.3 (H) 11.6 (H) 13.0 (H) 13.8 (H)   RBC      3.80 - 5.40 mill/uL 2.63 (L) 2.65 (L) 2.65 (L) 2.55 (L)   Hemoglobin      12.0 - 16.0 g/dL 7.9 (L) 8.1 (L) 8.0 (L) 7.7 (L)   Hematocrit      35.0 - 47.0 % 24.2 (L) 24.1 (L) 24.4 (L) 23.7 (L)   MCV      80 - 100 fL 92 91 92 93   MCH      27.0 - 34.0 pg 30.0 30.6 30.2 30.2   MCHC      32.0 - 36.0 g/dL 32.6 33.6 32.8 32.5   RDW      11.0 - 14.5 % 14.0 14.2 14.6 (H) 15.0 (H)   Platelets      140 - 440 thou/uL 219 247 280 309   MPV      8.5 - 12.5 fL 11.0 10.7 10.3 10.3       Results for orders placed or performed during the hospital encounter of 09/08/20   Basic Metabolic Panel   Result Value Ref Range    Sodium 138 136 - 145 mmol/L    Potassium 4.0 3.5 - 5.0 mmol/L    Chloride 102 98 - 107 mmol/L    CO2 26 22 - 31 mmol/L    Anion Gap, Calculation 10 5 - 18 mmol/L    Glucose 110 70 - 125 mg/dL    Calcium 8.0 (L) 8.5 - 10.5 mg/dL    BUN 19 8 - 28 mg/dL    Creatinine 0.60 0.60 - 1.10 mg/dL    GFR MDRD Af Amer >60 >60 mL/min/1.73m2    GFR MDRD Non Af Amer >60 >60 mL/min/1.73m2     Lab Results   Component Value Date    HGB 8.1 (L) 09/17/2020     Lab Results   Component Value Date    WBC 12.2 (H) 09/17/2020    HGB 8.1 (L) 09/17/2020    HCT 25.3 (L) 09/17/2020    MCV 96 09/17/2020     (H) 09/17/2020         Assessment/Plan:  1. Right distal femur  fracture. Sustained in a fall on 9/8/2020. S/p ORIF using intramedullary nail on 9/9/2020. TTWB. Follow up with ortho on 9/29/20.   2. Bilateral acute PE. Post op. No evidence right heart strain. On xarelto x 6 months.   3. ABLA. Sec to surgery. Received transfusion on 9/13/2020. DC from hosp at 7.7. Recheck 9/17/20 is 8.1. Started iron for 30 days.  4. Tachycardia. EKG with sinus tachy. She was noted to have this in the hospital with work up there. It has been persistent. Titrate metoprolol.   5. HTN. Metoprolol as noted. Monitor BPs in TCU.  6. Hypoxic resp failure. Multifactorial. Post op PEs, hypervolemia, atelectasis. No hypoxia in TCU, has not needed oxygen. Continue IS.   7. UTI. UC grew 10-50 K E coli. Completed course of Cefdinir. No urinary sx. No fever or abdominal pain.   8. Leukocytosis. Presented to ED with wbc over 17, last at 12.2 on 9/17/20. Likely reactive, multifactorial. Treated for UTI but no other sign infection.        Electronically signed by: Kiara Mares MD

## 2021-06-11 NOTE — PROGRESS NOTES
[facility]      Facility:    [facility] [[id number]]  Code Status: FULL CODE       Chief Complaint/Reason for Visit:  Chief Complaint   Patient presents with     H & P     TCU 9/15/2020. Admit note to TCU for right distal femur fracture, operative repair, post op PE.        HPI:   Jovana is a 75 y.o. female who fell on 9/8/2020, tripped at home and presented to the hospital with a right distal femur fracture. Hospital info as partially excerpted below.     75 y.o. old female with no significant past medical history who presented after mechanical  fall at home. Right femur xray and CT right knee showed comminuted distal femoral shaft fracture 0n 9/8/2020.  Admitted for further management.     Acute bilateral PE: bilateral segmental/subsegmental, w/o RV strain, Hemodynamically stable, , troponins negative.  TTE EF 75%, nml rv size/function although not well visualized, no PHTN, no hemodynamically significant valve disease.  Precipitant likely DVT RLE in setting of right femur fracture.  -Hgb stable x24hrs with change from IV Heparin to Xarelto.  Would plan 6 months anticoagulation for provoked PE   -wean off O2 as able at TCU     Acute comminuted distal femoral shaft fracture:  POD#6 ORIF right distal femur fracture using retrograde intramedullary nail.  EBL 200ml intra-op.  Orthopedic surgery follow-up as advised.  PT/OT.       Acute postop right leg pain: controlled on current regimen.     Acute hypoxic respiratory failure:  Stable/imrpoving.  2/2 bilateral PE and hypervolemia as well as not using I.S. and likely some atelectasis. COVID 9/8 and 9/14 negative.   -prn O2 and wean as able  -aggressive bronchial hygiene, I.S.     ABLA:  Hgb 15.3 (9/8)->9.7 (9/11)->7.9 9/12.  8.1 on 9/13 post 1U PRBC.  Hgb 7.7 on discharge.  CBC at TCU as advised.  Overall stable over past 36hrs.  Hemodynamically stable.     Sinus tachycardia: improved/stable.  multifactorial and  reactive from acute medical issues.      Uncomplicated UTI: UC pansensitive e. coli  -complete cefdinir as prescribed     Constipation  -miralax, senna, mobilize.      Leukocytosis: reactive. 13.8 on d/c.  procalcitonin negative. Afebrile.  Being treated for UTI on sensitive regimen, no PNA symptoms, negative COVID 9/8 and 9/14.  I presume it's related to known PE and most probable a RLE DVT which is already actively being treated.  No other s/s of infection elsewhere.  CBC at TCU as advised.  Aggressive I.S. for atelectasis.     Overall stabilized and discharged to TCU on 9/15/2020 for PT, OT, nursing cares, medical management and monitoring.       Today:  Admitted to TCU today, has bandage and ace wrap to RLE. Restricted to TTWB. Will participate in therapy as able. Has prn order for oxygen but has not needed, will be frequently assessed due to PEs and resp failure in the hospital. She denies shortness of breath at rest. No chest pain. Does feel tired and fatigued. Has spasms in legs for which she has taken flexeril and has been effective. Otherwise she is on scheduled tylenol and has prn oxycodone. Has been healthy prior, no hx of HTN, DM or any medical problems for which she is being treated. It is noted she had significantly elevated BPs when she presented to the ED, systolic over 200, contributors of acute pain and stress. Post op had bilateral PEs per CT on 9//10/2020. Sinus tachycardia also noted throughout hospital stay, noted to be improving and felt to be multifactorial due to medical events. She did have ABLA which required transfusion. Hgb today upon hospital DC is 7.7 and will be checked in 2 days. She denies dizziness. Appetite is pretty good. No diarrhea or constipation. No urinary sx. She is on a course of cefdinir for UTI. No fever or cough, tested neg for COVID per admissions criteria, test done on 9/14/2020. No new vision or hearing problems. She is , lives with .         Past  Medical History:  No significant medical history.   Denies HTN, DM, CA, lung disease.          Surgical History:  Past Surgical History:   Procedure Laterality Date     CYST REMOVAL      ear     ORIF FEMUR FRACTURE Right 9/9/2020    Procedure: OPEN REDUCTION INTERNAL FIXATION, FRACTURE, FEMUR;  Surgeon: Ad Blanca DO;  Location: [facility];  Service: Orthopedics       Family History:   Family History   Problem Relation Age of Onset     Heart disease Neg Hx        Social History:    Social History     Socioeconomic History     Marital status:      Spouse name: Not on file     Number of children: Not on file     Years of education: Not on file     Highest education level: Not on file   Occupational History     Not on file   Social Needs     Financial resource strain: Not on file     Food insecurity     Worry: Not on file     Inability: Not on file     Transportation needs     Medical: Not on file     Non-medical: Not on file   Tobacco Use     Smoking status: Former Smoker     Smokeless tobacco: Never Used   Substance and Sexual Activity     Alcohol use: Yes     Alcohol/week: 1.0 standard drinks     Types: 1 Shots of liquor per week     Frequency: 2-4 times a month     Drinks per session: 1 or 2     Binge frequency: Never     Drug use: Never     Sexual activity: Not on file   Lifestyle     Physical activity     Days per week: Not on file     Minutes per session: Not on file     Stress: Not on file   Relationships     Social connections     Talks on phone: Not on file     Gets together: Not on file     Attends Religion service: Not on file     Active member of club or organization: Not on file     Attends meetings of clubs or organizations: Not on file     Relationship status: Not on file     Intimate partner violence     Fear of current or ex partner: Not on file     Emotionally abused: Not on file     Physically abused: Not on file     Forced sexual activity: Not on file   Other Topics  Concern     Incontinent Not Asked     Toileting independently Not Asked     Cognitive impairment Not Asked     Vision impairment Not Asked     Hearing impairment Not Asked     Dentures Not Asked   Social History Narrative     Not on file       Medications:  Current Outpatient Medications   Medication Sig     acetaminophen (TYLENOL) 500 MG tablet Take 2 tablets (1,000 mg total) by mouth 3 (three) times a day for 7 days. Then three times a day PRN pain     bisacodyL (DULCOLAX) 10 mg suppository Insert 1 suppository (10 mg total) into the rectum daily as needed.     cyclobenzaprine (FLEXERIL) 5 MG tablet Take 1 tablet (5 mg total) by mouth 3 (three) times a day as needed for muscle spasms.     melatonin 3 mg Tab tablet Take 1 tablet (3 mg total) by mouth at bedtime as needed.     oxyCODONE (ROXICODONE) 5 MG immediate release tablet Take 1-2 tablets (5-10 mg total) by mouth every 4 (four) hours as needed (5mg for pain 5-7.  10mg for pain 8-10).     polyethylene glycol (MIRALAX) 17 gram packet Take 1 packet (17 g total) by mouth daily. Hold for 3 or more loose stools per day     rivaroxaban ANTICOAGULANT (XARELTO) 15 mg tablet Take 1 tablet (15 mg total) by mouth 2 (two) times a day with meals for 20 days.     [START ON 10/5/2020] rivaroxaban ANTICOAGULANT (XARELTO) 20 mg tablet Take 1 tablet (20 mg total) by mouth daily with supper.     senna-docusate (PERICOLACE) 8.6-50 mg tablet Take 2 tablets by mouth 2 (two) times a day. Hold for 3 or more loose stools per day       Allergies:  No Known Allergies       Review of Systems:  Pertinent items are noted in HPI.      Physical Exam:   Note: COVID-19 pandemic precautions in place. Physical exam performed with social distancing considerations.  General: Patient is alert pleasant female, no distress.   Vitals: /72, Temp 98.2, Pulse 92, RR 18, O2 sat 92%RA.  HEENT: Head is NCAT. Eyes show no injection or icterus. Nares negative. Oropharynx well hydrated.  Neck: Supple. No  tenderness or adenopathy. No JVD.  Lungs: Non labored respirations.  Abdomen: Soft, no tenderness. No guarding rebound or rigidity.  : Deferred.  Extremities: Mod right LE swelling.   Musculoskeletal: Swelling right LE.   Skin: Surgical incisions prox thigh, distal femur with staples, ecchymoses.   Psych: Mood appears good.      Labs:  Component      Latest Ref Rng & Units 9/8/2020 9/9/2020 9/10/2020 9/11/2020   WBC      4.0 - 11.0 thou/uL 17.6 (H) 10.2 8.2 11.7 (H)   RBC      3.80 - 5.40 mill/uL 5.09 4.18 3.38 (L) 3.24 (L)   Hemoglobin      12.0 - 16.0 g/dL 15.3 12.4 10.2 (L) 9.7 (L)   Hematocrit      35.0 - 47.0 % 45.8 37.4 31.6 (L) 30.0 (L)   MCV      80 - 100 fL 90 90 94 93   MCH      27.0 - 34.0 pg 30.1 29.7 30.2 29.9   MCHC      32.0 - 36.0 g/dL 33.4 33.2 32.3 32.3   RDW      11.0 - 14.5 % 13.6 13.8 14.0 14.0   Platelets      140 - 440 thou/uL 323 285 194 202   MPV      8.5 - 12.5 fL 10.5 10.3 10.4 10.9     Component      Latest Ref Rng & Units 9/12/2020 9/13/2020 9/14/2020 9/15/2020   WBC      4.0 - 11.0 thou/uL 11.3 (H) 11.6 (H) 13.0 (H) 13.8 (H)   RBC      3.80 - 5.40 mill/uL 2.63 (L) 2.65 (L) 2.65 (L) 2.55 (L)   Hemoglobin      12.0 - 16.0 g/dL 7.9 (L) 8.1 (L) 8.0 (L) 7.7 (L)   Hematocrit      35.0 - 47.0 % 24.2 (L) 24.1 (L) 24.4 (L) 23.7 (L)   MCV      80 - 100 fL 92 91 92 93   MCH      27.0 - 34.0 pg 30.0 30.6 30.2 30.2   MCHC      32.0 - 36.0 g/dL 32.6 33.6 32.8 32.5   RDW      11.0 - 14.5 % 14.0 14.2 14.6 (H) 15.0 (H)   Platelets      140 - 440 thou/uL 219 247 280 309   MPV      8.5 - 12.5 fL 11.0 10.7 10.3 10.3       Results for orders placed or performed during the hospital encounter of 09/08/20   Basic Metabolic Panel   Result Value Ref Range    Sodium 138 136 - 145 mmol/L    Potassium 4.0 3.5 - 5.0 mmol/L    Chloride 102 98 - 107 mmol/L    CO2 26 22 - 31 mmol/L    Anion Gap, Calculation 10 5 - 18 mmol/L    Glucose 110 70 - 125 mg/dL    Calcium 8.0 (L) 8.5 - 10.5 mg/dL    BUN 19 8 - 28 mg/dL     Creatinine 0.60 0.60 - 1.10 mg/dL    GFR MDRD Af Amer >60 >60 mL/min/1.73m2    GFR MDRD Non Af Amer >60 >60 mL/min/1.73m2       Assessment/Plan:  1. Right distal femur fracture. Sustained in a fall on 9/8/2020. S/p ORIF using retrograde intramedullary nail on 9/9/2020. TTWB. Therapy as able. Follow up with ortho.   2. Bilateral acute PE. Post op. No evidence right heart strain. On xarelto x 6 months.   3. ABLA. Sec to surgery. Received transfusion on 9/13/2020. DC from hosp hgb today at 7.7. Recheck in 2 days.   4. Pain management. She has prn flexeril effective for spasms, scheduled tylenol and prn oxycodone.   5. Hypoxic resp failure. Multifactorial. Post op PEs, hypervolemia, atelectasis. Has prn oxygen though on room air and sats ok for now. Encourage IS, monitor closely.  6. Sinus tachycardia. Noted in hospital, reported improving. Frequent monitoring in TCU, vitals q shift per protocol.   7. UTI. UC grew 10-50 K E coli. Will complete course of Cefdinir on 9//17/2020. No current urinary sx. No fever or abdominal pain.   8. Leukocytosis. Presented to ED with wbc over 17, last at 13.8 today. Likely reactive, multifactorial. Treated for UTI but no other sign infection.  9. Elevated BPs. Will be monitored further, not on BP meds, no prior hx HTN. When she was in ED, BPs 212/108, 190/105, contributors acute pain, stress. Just admitted to TCU today, /72.  10. Code status is full code.             Electronically signed by: Kiara Mares MD

## 2021-06-11 NOTE — ANESTHESIA PROCEDURE NOTES
Spinal Block    Patient location during procedure: OR  Start time: 9/9/2020 4:15 PM  End time: 9/9/2020 4:18 PM  Reason for block: primary anesthetic    Staffing:  Performing  Anesthesiologist: Stef Vega MD    Preanesthetic Checklist  Completed: patient identified, risks, benefits, and alternatives discussed, timeout performed, consent obtained, airway assessed, oxygen available, suction available, emergency drugs available and hand hygiene performed  Spinal Block  Patient position: sitting  Prep: ChloraPrep  Patient monitoring: heart rate, cardiac monitor, continuous pulse ox and blood pressure  Approach: midline  Location: L3-4  Injection technique: single-shot  Needle type: pencil-tip   Needle gauge: 24 G

## 2021-06-12 NOTE — TELEPHONE ENCOUNTER
[facility] Nurse Triage Telephone Note      Provider: HECTOR Ruelas  Facility: [facility] Type: TCU    Caller: Princess  Call Back Number:  [phone]    Allergies: Patient has no known allergies.    Reason for call: Nurse reporting that patient has been tapering down on Xarelto from 15mg two times a day to 20mg daily starting today.  Nurse states the 20mg dose hasn't been sent from the pharmacy.       Verbal Order/Direction given by Provider: Take Xarelto 15mg tonight and then start 20mg daily as of tomorrow.      Provider giving order: HECTOR Ruelas    Verbal order given to: Princess Dior RN

## 2021-06-12 NOTE — PROGRESS NOTES
[facility]    Facility:   [facility] [[id number]]   Code Status: DNR      CHIEF COMPLAINT/REASON FOR VISIT:  Chief Complaint   Patient presents with     Problem Visit     F/U right hip sirgery, BP       HISTORY:      HPI: Jovana is a 75 y.o. female undergoing physical and occupational [facility]   She was hospitalized from 9/8-9/15/20. with no significant past medical history who presented after mechanical  fall at home. Right femur xray and CT right knee showed comminuted distal femoral shaft fracture. She underwent  ORIF right distal femur fracture using retrograde intramedullary nail. She also developed acute bilateral PE's and is on Xarelto.     Today she is seen to review right hip incision and BP's. Right hip incision is healing well with no S/S infection. Staples have been removed on 9/29. /95. She is on on metoprolol and Norvasc recently added. She  Denies fever, chills,Denies any chest pain,headaches,lightheadedness, dizziness,   shortness of breath, or cough. Appetite is good. Denies any GERD symptoms.   Denies any difficulty with swallowing,Denies any abdominal pain, constipation or loose stools. No insomnia. No active bleeding. She continues to be TTWB.     Past Medical History:   Diagnosis Date     Secondary hypertension 9/21/2020             Family History   Problem Relation Age of Onset     Heart disease Neg Hx      Social History     Socioeconomic History     Marital status:      Spouse name: Not on file     Number of children: Not on file     Years of education: Not on file     Highest education level: Not on file   Occupational History     Not on file   Social Needs     Financial resource strain: Not on file     Food insecurity     Worry: Not on file     Inability: Not on file     Transportation needs     Medical: Not on file     Non-medical: Not on file   Tobacco Use     Smoking status: Former Smoker      Smokeless tobacco: Never Used   Substance and Sexual Activity     Alcohol use: Yes     Alcohol/week: 1.0 standard drinks     Types: 1 Shots of liquor per week     Frequency: 2-4 times a month     Drinks per session: 1 or 2     Binge frequency: Never     Drug use: Never     Sexual activity: Not on file   Lifestyle     Physical activity     Days per week: Not on file     Minutes per session: Not on file     Stress: Not on file   Relationships     Social connections     Talks on phone: Not on file     Gets together: Not on file     Attends Mu-ism service: Not on file     Active member of club or organization: Not on file     Attends meetings of clubs or organizations: Not on file     Relationship status: Not on file     Intimate partner violence     Fear of current or ex partner: Not on file     Emotionally abused: Not on file     Physically abused: Not on file     Forced sexual activity: Not on file   Other Topics Concern     Incontinent Not Asked     Toileting independently Not Asked     Cognitive impairment Not Asked     Vision impairment Not Asked     Hearing impairment Not Asked     Dentures Not Asked   Social History Narrative     Not on file         Review of Systems   Constitutional: Negative for activity change, appetite change, fatigue and fever.   HENT: Negative for congestion.    Respiratory: Negative for cough, shortness of breath and wheezing.    Cardiovascular: Negative for chest pain and leg swelling.   Gastrointestinal: Negative for abdominal distention, abdominal pain, constipation, diarrhea and nausea.   Genitourinary: Negative for dysuria.   Musculoskeletal: Positive for arthralgias. Negative for back pain.   Skin: Positive for wound. Negative for color change.   Neurological: Negative for dizziness.   Psychiatric/Behavioral: Negative for agitation, behavioral problems and confusion.       Vitals:    10/02/20 0909   BP: (!) 157/95   Pulse: 77   Resp: 18   Temp: 98.9  F (37.2  C)   SpO2: 94%    Weight: (!) 264 lb 1.6 oz (119.8 kg)       Physical Exam  Constitutional:       Appearance: She is well-developed.   HENT:      Head: Normocephalic.   Eyes:      Conjunctiva/sclera: Conjunctivae normal.   Neck:      Musculoskeletal: Normal range of motion.   Cardiovascular:      Rate and Rhythm: Normal rate and regular rhythm.      Heart sounds: Normal heart sounds. No murmur.   Pulmonary:      Effort: No respiratory distress.      Breath sounds: Normal breath sounds. No wheezing or rales.   Abdominal:      General: Bowel sounds are normal. There is no distension.      Palpations: Abdomen is soft.      Tenderness: There is no abdominal tenderness.   Musculoskeletal:      Right lower leg: Edema present.      Comments: Decreased RLE   Skin:     General: Skin is warm.      Comments: 3 hip incisions right lateral thigh. Staples have been removed.    Neurological:      Mental Status: She is alert and oriented to person, place, and time.   Psychiatric:         Behavior: Behavior normal.           LABS:   No results found for this or any previous visit (from the past 240 hour(s)).  Current Outpatient Medications   Medication Sig     acetaminophen (TYLENOL) 500 MG tablet Take 1,000 mg by mouth every 8 (eight) hours as needed for pain.     amLODIPine (NORVASC) 2.5 MG tablet Take 2.5 mg by mouth daily.     bisacodyL (DULCOLAX) 10 mg suppository Insert 1 suppository (10 mg total) into the rectum daily as needed.     cyclobenzaprine (FLEXERIL) 5 MG tablet Take 1 tablet (5 mg total) by mouth 3 (three) times a day as needed for muscle spasms.     ferrous sulfate 325 (65 FE) MG tablet Take 1 tablet by mouth daily with breakfast.     melatonin 3 mg Tab tablet Take 1 tablet (3 mg total) by mouth at bedtime as needed.     metoprolol tartrate (LOPRESSOR) 25 MG tablet Take 25 mg by mouth 2 (two) times a day.     oxyCODONE (ROXICODONE) 5 MG immediate release tablet Take 1-2 tablets (5-10 mg total) by mouth every 4 (four) hours as  needed (5mg for pain 5-7.  10mg for pain 8-10).     polyethylene glycol (MIRALAX) 17 gram packet Take 1 packet (17 g total) by mouth daily. Hold for 3 or more loose stools per day     rivaroxaban ANTICOAGULANT (XARELTO) 15 mg tablet Take 1 tablet (15 mg total) by mouth 2 (two) times a day with meals for 20 days.     [START ON 10/5/2020] rivaroxaban ANTICOAGULANT (XARELTO) 20 mg tablet Take 1 tablet (20 mg total) by mouth daily with supper.     senna-docusate (PERICOLACE) 8.6-50 mg tablet Take 2 tablets by mouth 2 (two) times a day. Hold for 3 or more loose stools per day     ASSESSMENT:      ICD-10-CM    1. Secondary hypertension  I15.9    2. Displaced comminuted fracture of shaft of right femur, initial encounter for closed fracture (H)  S72.351A    3. Pain management  R52        PLAN:    ORIF right femur PT OT, pain control,  follow-up with orthopedics as scheduled    Acute blood loss anemia hemoglobin 8.1 on 9/17/2020, on ferrous sulfate, monitor labs    Hypertension metoprolol tartrate to 25 mg twice daily recently started on Norvasc.     Anticoagulation on Xarelto    Insomnia continue melatonin    Pain management on extra strength Tylenol and oxycodone as needed    Electronically signed by: Jess Presley CNP

## 2021-06-12 NOTE — PROGRESS NOTES
[facility]    Facility:   [facility] [[id number]]   Code Status: FULL CODE       Chief Complaint   Patient presents with     Follow Up     TCU 10/6/2020. Right distal femur fracture. HTN.        TCU HPI:   Jovana is a 75 y.o. female who fell on 9/8/2020, tripped at home and presented to the hospital with a right distal femur fracture. Hospital info as partially excerpted below.     75 y.o. old female with no significant past medical history who presented after mechanical  fall at home. Right femur xray and CT right knee showed comminuted distal femoral shaft fracture 0n 9/8/2020.  Admitted for further management.     Acute bilateral PE: bilateral segmental/subsegmental, w/o RV strain, Hemodynamically stable, , troponins negative.  TTE EF 75%, nml rv size/function although not well visualized, no PHTN, no hemodynamically significant valve disease.  Precipitant likely DVT RLE in setting of right femur fracture.  -Hgb stable x24hrs with change from IV Heparin to Xarelto.  Would plan 6 months anticoagulation for provoked PE   -wean off O2 as able at TCU     Acute comminuted distal femoral shaft fracture:  POD#6 ORIF right distal femur fracture using retrograde intramedullary nail.  EBL 200ml intra-op.  Orthopedic surgery follow-up as advised.  PT/OT.       Acute postop right leg pain: controlled on current regimen.     Acute hypoxic respiratory failure:  Stable/imrpoving.  2/2 bilateral PE and hypervolemia as well as not using I.S. and likely some atelectasis. COVID 9/8 and 9/14 negative.   -prn O2 and wean as able  -aggressive bronchial hygiene, I.S.     ABLA:  Hgb 15.3 (9/8)->9.7 (9/11)->7.9 9/12.  8.1 on 9/13 post 1U PRBC.  Hgb 7.7 on discharge.  CBC at TCU as advised.  Overall stable over past 36hrs.  Hemodynamically stable.     Sinus tachycardia: improved/stable.  multifactorial and reactive from acute medical issues.      Uncomplicated UTI: UC pansensitive e.  coli  -complete cefdinir as prescribed     Constipation  -miralax, senna, mobilize.      Leukocytosis: reactive. 13.8 on d/c.  procalcitonin negative. Afebrile.  Being treated for UTI on sensitive regimen, no PNA symptoms, negative COVID 9/8 and 9/14.  I presume it's related to known PE and most probable a RLE DVT which is already actively being treated.  No other s/s of infection elsewhere.  CBC at TCU as advised.  Aggressive I.S. for atelectasis.     Overall stabilized and discharged to TCU on 9/15/2020 for PT, OT, nursing cares, medical management and monitoring.       Today:  She saw ortho 9/29/2020, staples removed, continue TTWB, RTC 10/13/2020. Pain under good control. Swelling much less, ROM improved at knee. On Metoprolol and amlodipine for HTN and intermittent sinus tachycardia. She denies headaches, palpitations, shortness of breath. No hypoxia. On xarelto due to post op PE. She did have ABLA which required transfusion. Hgb upon hospital DC on 9/15/2020 was 7.7 and is 8.1 follow up in TCU on 9/17/2020. She is on iron. She denies dizziness. Appetite is good. No diarrhea or constipation. No urinary sx. No fever or cough.      Past Medical History:  No significant medical history.   Denies HTN, DM, CA, lung disease.      Medications:  Current Outpatient Medications   Medication Sig     acetaminophen (TYLENOL) 500 MG tablet Take 1,000 mg by mouth every 8 (eight) hours as needed for pain.     amLODIPine (NORVASC) 2.5 MG tablet Take 2.5 mg by mouth daily.     bisacodyL (DULCOLAX) 10 mg suppository Insert 1 suppository (10 mg total) into the rectum daily as needed.     cyclobenzaprine (FLEXERIL) 5 MG tablet Take 1 tablet (5 mg total) by mouth 3 (three) times a day as needed for muscle spasms.     ferrous sulfate 325 (65 FE) MG tablet Take 1 tablet by mouth daily with breakfast.     melatonin 3 mg Tab tablet Take 1 tablet (3 mg total) by mouth at bedtime as needed.     metoprolol tartrate (LOPRESSOR) 25 MG tablet  Take 25 mg by mouth 2 (two) times a day.     oxyCODONE (ROXICODONE) 5 MG immediate release tablet Take 1-2 tablets (5-10 mg total) by mouth every 4 (four) hours as needed (5mg for pain 5-7.  10mg for pain 8-10).     polyethylene glycol (MIRALAX) 17 gram packet Take 1 packet (17 g total) by mouth daily. Hold for 3 or more loose stools per day     rivaroxaban ANTICOAGULANT (XARELTO) 20 mg tablet Take 1 tablet (20 mg total) by mouth daily with supper.     senna-docusate (PERICOLACE) 8.6-50 mg tablet Take 2 tablets by mouth 2 (two) times a day. Hold for 3 or more loose stools per day       Physical Exam:   Note: COVID-19 pandemic precautions in place. Physical exam performed with social distancing considerations.  General: Patient is alert pleasant female, no distress.   Vitals: /82, Temp 98, Pulse 90, RR 18, O2 sat 96%RA.  HEENT: Head is NCAT. Eyes show no injection or icterus. Nares negative. Oropharynx well hydrated.  Neck: Supple. No tenderness or adenopathy. No JVD.  Lungs: Non labored respirations.  Abdomen: Soft, no tenderness. No guarding rebound or rigidity.  : Deferred.  Extremities: Mod right knee swelling.   Skin: Surgical incisions prox thigh, distal femur, healing well.  Psych: Mood appears good.      Labs:  Component      Latest Ref Rng & Units 9/8/2020 9/9/2020 9/10/2020 9/11/2020   WBC      4.0 - 11.0 thou/uL 17.6 (H) 10.2 8.2 11.7 (H)   RBC      3.80 - 5.40 mill/uL 5.09 4.18 3.38 (L) 3.24 (L)   Hemoglobin      12.0 - 16.0 g/dL 15.3 12.4 10.2 (L) 9.7 (L)   Hematocrit      35.0 - 47.0 % 45.8 37.4 31.6 (L) 30.0 (L)   MCV      80 - 100 fL 90 90 94 93   MCH      27.0 - 34.0 pg 30.1 29.7 30.2 29.9   MCHC      32.0 - 36.0 g/dL 33.4 33.2 32.3 32.3   RDW      11.0 - 14.5 % 13.6 13.8 14.0 14.0   Platelets      140 - 440 thou/uL 323 285 194 202   MPV      8.5 - 12.5 fL 10.5 10.3 10.4 10.9     Component      Latest Ref Rng & Units 9/12/2020 9/13/2020 9/14/2020 9/15/2020   WBC      4.0 - 11.0 thou/uL 11.3  (H) 11.6 (H) 13.0 (H) 13.8 (H)   RBC      3.80 - 5.40 mill/uL 2.63 (L) 2.65 (L) 2.65 (L) 2.55 (L)   Hemoglobin      12.0 - 16.0 g/dL 7.9 (L) 8.1 (L) 8.0 (L) 7.7 (L)   Hematocrit      35.0 - 47.0 % 24.2 (L) 24.1 (L) 24.4 (L) 23.7 (L)   MCV      80 - 100 fL 92 91 92 93   MCH      27.0 - 34.0 pg 30.0 30.6 30.2 30.2   MCHC      32.0 - 36.0 g/dL 32.6 33.6 32.8 32.5   RDW      11.0 - 14.5 % 14.0 14.2 14.6 (H) 15.0 (H)   Platelets      140 - 440 thou/uL 219 247 280 309   MPV      8.5 - 12.5 fL 11.0 10.7 10.3 10.3       Results for orders placed or performed during the hospital encounter of 09/08/20   Basic Metabolic Panel   Result Value Ref Range    Sodium 138 136 - 145 mmol/L    Potassium 4.0 3.5 - 5.0 mmol/L    Chloride 102 98 - 107 mmol/L    CO2 26 22 - 31 mmol/L    Anion Gap, Calculation 10 5 - 18 mmol/L    Glucose 110 70 - 125 mg/dL    Calcium 8.0 (L) 8.5 - 10.5 mg/dL    BUN 19 8 - 28 mg/dL    Creatinine 0.60 0.60 - 1.10 mg/dL    GFR MDRD Af Amer >60 >60 mL/min/1.73m2    GFR MDRD Non Af Amer >60 >60 mL/min/1.73m2     Lab Results   Component Value Date    HGB 8.1 (L) 09/17/2020     Lab Results   Component Value Date    WBC 12.2 (H) 09/17/2020    HGB 8.1 (L) 09/17/2020    HCT 25.3 (L) 09/17/2020    MCV 96 09/17/2020     (H) 09/17/2020       Assessment/Plan:  1. Right distal femur fracture. Sustained in a fall on 9/8/2020. S/p ORIF with intramedullary nail on 9/9/2020. TTWB. Ortho visit 9/29/2020, next 10/13/2020.    2. Bilateral post op acute PE. On xarelto x 6 months. No chest pain or hypoxia.   3. ABLA. Sec to surgery. Received transfusion on 9/13/2020. DC from hosp at 7.7. Recheck 9/17/20 was 8.1. Continue iron.  4. HTN. On metoprolol and amlodipine. BPs monitored.   5. Tachycardia. EKG showed sinus tach. Improved since starting metoprolol for BP and HR. Will continue to titrate dose. Asymptomatic.            Electronically signed by: Kiara Mares MD

## 2021-06-12 NOTE — PROGRESS NOTES
[facility]    Facility:   [facility] [[id number]]   Code Status: FULL CODE       Chief Complaint   Patient presents with     Follow Up     TCU 10/15/2020. Right femur fracture. HTN.        TCU HPI:   Jovana is a 75 y.o. female who fell on 9/8/2020, tripped at home and presented to the hospital with a right distal femur fracture. Hospital info as partially excerpted below.     75 y.o. old female with no significant past medical history who presented after mechanical  fall at home. Right femur xray and CT right knee showed comminuted distal femoral shaft fracture 0n 9/8/2020.  Admitted for further management.     Acute bilateral PE: bilateral segmental/subsegmental, w/o RV strain, Hemodynamically stable, , troponins negative.  TTE EF 75%, nml rv size/function although not well visualized, no PHTN, no hemodynamically significant valve disease.  Precipitant likely DVT RLE in setting of right femur fracture.  -Hgb stable x24hrs with change from IV Heparin to Xarelto.  Would plan 6 months anticoagulation for provoked PE   -wean off O2 as able at TCU     Acute comminuted distal femoral shaft fracture:  POD#6 ORIF right distal femur fracture using retrograde intramedullary nail.  EBL 200ml intra-op.  Orthopedic surgery follow-up as advised.  PT/OT.       Acute postop right leg pain: controlled on current regimen.     Acute hypoxic respiratory failure:  Stable/improving.  2/2 bilateral PE and hypervolemia as well as not using I.S. and likely some atelectasis. COVID 9/8 and 9/14 negative.   -prn O2 and wean as able  -aggressive bronchial hygiene, I.S.     ABLA:  Hgb 15.3 (9/8)->9.7 (9/11)->7.9 9/12.  8.1 on 9/13 post 1U PRBC.  Hgb 7.7 on discharge.  CBC at TCU as advised.  Overall stable over past 36hrs.  Hemodynamically stable.     Sinus tachycardia: improved/stable.  multifactorial and reactive from acute medical issues.      Uncomplicated UTI: UC pansensitive e.  "coli  -complete cefdinir as prescribed     Constipation  -miralax, senna, mobilize.      Leukocytosis: reactive. 13.8 on d/c.  procalcitonin negative. Afebrile.  Being treated for UTI on sensitive regimen, no PNA symptoms, negative COVID 9/8 and 9/14.  I presume it's related to known PE and most probable a RLE DVT which is already actively being treated.  No other s/s of infection elsewhere.  CBC at TCU as advised.  Aggressive I.S. for atelectasis.     Overall stabilized and discharged to TCU on 9/15/2020 for PT, OT, nursing cares, medical management and monitoring.       Today:  She saw ortho 9/29/2020, staples removed, continue TTWB, RTC 10/13/2020. There was an apparent mix up with the appt and it was not 10/13/2020 but was today 10/15/2020. The referral form came back indicating \"stable appearing right distal femur fracture. Incisions healing well.\" Orders to begin sequential weight bearing starting with 25% on 10/19/2020, then 50% on 10/26/2020 and 75% on 11/2/2020. RTC 4 weeks. She denies significant pain. Swelling is down. Working on ROM. BP control is better, currently on amlodipine 5 mg and metoprolol 50 two times a day. May need further adjustments however. She had Renal US 9/28/2020 showing no evidence of BETTYE induced HTN. She has been asymptomatic, tolerating meds. She is on xarelto due to PE. No SOB or hypoxia, No chest pain or palpitations. She did have ABLA which required transfusion in the hospital. Hgb upon hospital DC on 9/15/2020 was 7.7 and was 8.1 on follow up in TCU 9/17/2020. Recheck on 10/13/2020 was 11.1. She is on iron. She denies dizziness. Appetite is good. No diarrhea or constipation. No urinary sx. No fever or cough.      Past Medical History:  No significant medical history.   Denies HTN, DM, CA, lung disease.      Medications:  Current Outpatient Medications   Medication Sig     acetaminophen (TYLENOL) 500 MG tablet Take 1,000 mg by mouth every 8 (eight) hours as needed for pain.     " amLODIPine (NORVASC) 2.5 MG tablet Take 5 mg by mouth daily.      bisacodyL (DULCOLAX) 10 mg suppository Insert 1 suppository (10 mg total) into the rectum daily as needed.     cyclobenzaprine (FLEXERIL) 5 MG tablet Take 1 tablet (5 mg total) by mouth 3 (three) times a day as needed for muscle spasms.     melatonin 3 mg Tab tablet Take 1 tablet (3 mg total) by mouth at bedtime as needed.     metoprolol tartrate (LOPRESSOR) 25 MG tablet Take 50 mg by mouth 2 (two) times a day.      oxyCODONE (ROXICODONE) 5 MG immediate release tablet Take 1-2 tablets (5-10 mg total) by mouth every 4 (four) hours as needed (5mg for pain 5-7.  10mg for pain 8-10).     polyethylene glycol (MIRALAX) 17 gram packet Take 1 packet (17 g total) by mouth daily. Hold for 3 or more loose stools per day     rivaroxaban ANTICOAGULANT (XARELTO) 20 mg tablet Take 1 tablet (20 mg total) by mouth daily with supper.     senna-docusate (PERICOLACE) 8.6-50 mg tablet Take 2 tablets by mouth 2 (two) times a day. Hold for 3 or more loose stools per day       Physical Exam:   Note: COVID-19 pandemic precautions in place. Physical exam performed with social distancing considerations.  General: Patient is alert pleasant female, no distress.   Vitals: /74, Temp 98.4, Pulse 85, RR 20, O2 sat 95%RA.  HEENT: Head is NCAT. Eyes show no injection or icterus. Nares negative. Oropharynx well hydrated.  Neck: Supple. No tenderness or adenopathy. No JVD.  Lungs: Non labored respirations.  Abdomen: Soft, no tenderness. No guarding rebound or rigidity.  : Deferred.  Extremities: Mild right knee swelling.   Skin: Surgical incisions healed.  Psych: Mood appears good.      Labs:  Component      Latest Ref Rng & Units 9/8/2020 9/9/2020 9/10/2020 9/11/2020   WBC      4.0 - 11.0 thou/uL 17.6 (H) 10.2 8.2 11.7 (H)   RBC      3.80 - 5.40 mill/uL 5.09 4.18 3.38 (L) 3.24 (L)   Hemoglobin      12.0 - 16.0 g/dL 15.3 12.4 10.2 (L) 9.7 (L)   Hematocrit      35.0 - 47.0 % 45.8  37.4 31.6 (L) 30.0 (L)   MCV      80 - 100 fL 90 90 94 93   MCH      27.0 - 34.0 pg 30.1 29.7 30.2 29.9   MCHC      32.0 - 36.0 g/dL 33.4 33.2 32.3 32.3   RDW      11.0 - 14.5 % 13.6 13.8 14.0 14.0   Platelets      140 - 440 thou/uL 323 285 194 202   MPV      8.5 - 12.5 fL 10.5 10.3 10.4 10.9     Component      Latest Ref Rng & Units 9/12/2020 9/13/2020 9/14/2020 9/15/2020   WBC      4.0 - 11.0 thou/uL 11.3 (H) 11.6 (H) 13.0 (H) 13.8 (H)   RBC      3.80 - 5.40 mill/uL 2.63 (L) 2.65 (L) 2.65 (L) 2.55 (L)   Hemoglobin      12.0 - 16.0 g/dL 7.9 (L) 8.1 (L) 8.0 (L) 7.7 (L)   Hematocrit      35.0 - 47.0 % 24.2 (L) 24.1 (L) 24.4 (L) 23.7 (L)   MCV      80 - 100 fL 92 91 92 93   MCH      27.0 - 34.0 pg 30.0 30.6 30.2 30.2   MCHC      32.0 - 36.0 g/dL 32.6 33.6 32.8 32.5   RDW      11.0 - 14.5 % 14.0 14.2 14.6 (H) 15.0 (H)   Platelets      140 - 440 thou/uL 219 247 280 309   MPV      8.5 - 12.5 fL 11.0 10.7 10.3 10.3       Results for orders placed or performed during the hospital encounter of 09/08/20   Basic Metabolic Panel   Result Value Ref Range    Sodium 138 136 - 145 mmol/L    Potassium 4.0 3.5 - 5.0 mmol/L    Chloride 102 98 - 107 mmol/L    CO2 26 22 - 31 mmol/L    Anion Gap, Calculation 10 5 - 18 mmol/L    Glucose 110 70 - 125 mg/dL    Calcium 8.0 (L) 8.5 - 10.5 mg/dL    BUN 19 8 - 28 mg/dL    Creatinine 0.60 0.60 - 1.10 mg/dL    GFR MDRD Af Amer >60 >60 mL/min/1.73m2    GFR MDRD Non Af Amer >60 >60 mL/min/1.73m2     Lab Results   Component Value Date    HGB 8.1 (L) 09/17/2020     Lab Results   Component Value Date    WBC 12.2 (H) 09/17/2020    HGB 8.1 (L) 09/17/2020    HCT 25.3 (L) 09/17/2020    MCV 96 09/17/2020     (H) 09/17/2020     Lab Results   Component Value Date    HGB 11.1 (L) 10/13/2020       Renal US 9/28/2020 (PPX):  No BETTYE.   No hydronephrosis or shadowing calculi.      Assessment/Plan:  1. Right distal femur fracture. Sustained in a fall on 9/8/2020. S/p ORIF with intramedullary nail on  9/9/2020. TTWB. Ortho visits 9/29/2020, 10/15/2020. Now allowed to start WB beginning on 10/19/2020 with 25% and progress from there. RTC ortho 4 weeks.   2. Bilateral post op acute PE. On xarelto x 6 months. No chest pain or hypoxia.   3. ABLA. Sec to surgery. Received transfusion on 9/13/2020. DC from hosp at 7.7. Recheck 9/17/20 was 8.1, last hgb on 10/13/2020 was 11.1. Continue iron.  4. HTN. On metoprolol and amlodipine. BPs monitored, continue to titrate meds as needed.    5. Tachycardia. EKG showed sinus tach. Improved since starting metoprolol for BP and HR. Asymptomatic.          Electronically signed by: Kiara Mares MD

## 2021-06-12 NOTE — PROGRESS NOTES
[facility]    Facility:   [facility] [[id number]]   Code Status: DNR      CHIEF COMPLAINT/REASON FOR VISIT:  Chief Complaint   Patient presents with     Review Of Multiple Medical Conditions     F/U right hip surgery, Pain management        HISTORY:      HPI: Jovana is a 75 y.o. female undergoing physical and occupational therapy at [facility]. She is with past medical history of  Hypertension.  who presented after mechanical  fall at home. Right femur xray and CT right knee showed comminuted distal femoral shaft fracture. She underwent  ORIF right distal femur fracture using retrograde intramedullary nail. She also developed acute bilateral PE's and is on Xarelto.     She was hospitalized from 9/8-9/15/20.     Today she is seen to review blood pressure, right hip incision and F/U to vaginal bleeding. She reports the vaginal bleeding is intermittent and GYN consult pending.  He staples have been removed and her incision is healing well. Her blood pressures were reviewed and continue to be elevated.  She is currently on metoprolol and Norvasc 2.5 mg.  Her Norvasc was recently  increased to 5 mg.   She  Denies fever, chills,Denies any chest pain,headaches,lightheadedness, dizziness,   shortness of breath, or cough. Appetite is good. Denies any GERD symptoms.   Denies any difficulty with swallowing,Denies any abdominal pain, constipation or loose stools. No insomnia. No active bleeding. She continues to be TTWB and is now on a weight bearing taper. She will increase to 75% weight bearing status on 11/2/20.  Her pain is controlled.      Past Medical History:   Diagnosis Date     Secondary hypertension 9/21/2020             Family History   Problem Relation Age of Onset     Heart disease Neg Hx      Social History     Socioeconomic History     Marital status:      Spouse name: Not on file     Number of children: Not on file     Years of education:  Not on file     Highest education level: Not on file   Occupational History     Not on file   Social Needs     Financial resource strain: Not on file     Food insecurity     Worry: Not on file     Inability: Not on file     Transportation needs     Medical: Not on file     Non-medical: Not on file   Tobacco Use     Smoking status: Former Smoker     Smokeless tobacco: Never Used   Substance and Sexual Activity     Alcohol use: Yes     Alcohol/week: 1.0 standard drinks     Types: 1 Shots of liquor per week     Frequency: 2-4 times a month     Drinks per session: 1 or 2     Binge frequency: Never     Drug use: Never     Sexual activity: Not on file   Lifestyle     Physical activity     Days per week: Not on file     Minutes per session: Not on file     Stress: Not on file   Relationships     Social connections     Talks on phone: Not on file     Gets together: Not on file     Attends Zoroastrian service: Not on file     Active member of club or organization: Not on file     Attends meetings of clubs or organizations: Not on file     Relationship status: Not on file     Intimate partner violence     Fear of current or ex partner: Not on file     Emotionally abused: Not on file     Physically abused: Not on file     Forced sexual activity: Not on file   Other Topics Concern     Incontinent Not Asked     Toileting independently Not Asked     Cognitive impairment Not Asked     Vision impairment Not Asked     Hearing impairment Not Asked     Dentures Not Asked   Social History Narrative     Not on file         Review of Systems   Constitutional: Positive for activity change. Negative for appetite change, fatigue and fever.        TTWB with a weight bearing taper. She will increase to 75% weight bearing on 11/2/20   HENT: Negative for congestion.    Respiratory: Negative for cough, shortness of breath and wheezing.    Cardiovascular: Negative for chest pain and leg swelling.   Gastrointestinal: Negative for abdominal  distention, abdominal pain, constipation, diarrhea and nausea.   Genitourinary: Negative for dysuria.   Musculoskeletal: Positive for arthralgias. Negative for back pain.   Skin: Positive for wound. Negative for color change.   Neurological: Negative for dizziness.   Psychiatric/Behavioral: Negative for agitation, behavioral problems and confusion.       Vitals:    10/28/20 0813   BP: (!) 161/94   Pulse: 70   Resp: 18   Temp: 98.4  F (36.9  C)   SpO2: 93%   Weight: (!) 244 lb (110.7 kg)       Physical Exam  Constitutional:       Appearance: She is well-developed.   HENT:      Head: Normocephalic.   Eyes:      Conjunctiva/sclera: Conjunctivae normal.   Neck:      Musculoskeletal: Normal range of motion.   Cardiovascular:      Rate and Rhythm: Normal rate and regular rhythm.      Heart sounds: Normal heart sounds. No murmur.   Pulmonary:      Effort: No respiratory distress.      Breath sounds: Normal breath sounds. No wheezing or rales.   Abdominal:      General: Bowel sounds are normal. There is no distension.      Palpations: Abdomen is soft.      Tenderness: There is no abdominal tenderness.   Genitourinary:     Comments: Vaginal bleeding noted  Musculoskeletal:      Right lower leg: Edema present.      Comments: Decreased RLE   Skin:     General: Skin is warm.      Comments: 3 hip incisions right lateral thigh. Staples have been removed.    Neurological:      Mental Status: She is alert and oriented to person, place, and time.   Psychiatric:         Behavior: Behavior normal.           LABS:   No results found for this or any previous visit (from the past 240 hour(s)).  Current Outpatient Medications   Medication Sig     acetaminophen (TYLENOL) 500 MG tablet Take 1,000 mg by mouth every 8 (eight) hours as needed for pain.     amLODIPine (NORVASC) 2.5 MG tablet Take 5 mg by mouth daily.      bisacodyL (DULCOLAX) 10 mg suppository Insert 1 suppository (10 mg total) into the rectum daily as needed.      cyclobenzaprine (FLEXERIL) 5 MG tablet Take 1 tablet (5 mg total) by mouth 3 (three) times a day as needed for muscle spasms.     melatonin 3 mg Tab tablet Take 1 tablet (3 mg total) by mouth at bedtime as needed.     metoprolol tartrate (LOPRESSOR) 25 MG tablet Take 75 mg by mouth 2 (two) times a day.      oxyCODONE (ROXICODONE) 5 MG immediate release tablet Take 1-2 tablets (5-10 mg total) by mouth every 4 (four) hours as needed (5mg for pain 5-7.  10mg for pain 8-10).     polyethylene glycol (MIRALAX) 17 gram packet Take 1 packet (17 g total) by mouth daily. Hold for 3 or more loose stools per day     rivaroxaban ANTICOAGULANT (XARELTO) 20 mg tablet Take 1 tablet (20 mg total) by mouth daily with supper.     senna-docusate (PERICOLACE) 8.6-50 mg tablet Take 2 tablets by mouth 2 (two) times a day. Hold for 3 or more loose stools per day     ASSESSMENT:      ICD-10-CM    1. Closed fracture of distal end of right femur with routine healing, unspecified fracture morphology, subsequent encounter  S72.401D    2. Pain management  R52    3. Secondary hypertension  I15.9        PLAN:      Vaginal bleeding  GYN consult, reports intermittent     ORIF right femur PT OT, pain control,  follow-up with orthopedics as scheduled Pt can increase to 75% weight bearing on 11/2/20    Acute blood loss anemia  HGB 11.1 on 10/13. Up from hemoglobin 8.1 on 9/17/2020, on ferrous sulfate, monitor labs    Hypertension metoprolol tartrate to 25 mg twice daily,  Norvasc recently increased to 5 mg daily    Anticoagulation on Xarelto    Insomnia continue melatonin    Pain management on extra strength Tylenol and oxycodone as needed    Electronically signed by: Jess Presley CNP

## 2021-06-12 NOTE — PROGRESS NOTES
[facility]    Facility:   [facility] [[id number]]   Code Status: FULL CODE       Chief Complaint   Patient presents with     Follow Up     TCU 10/20/2020. Right distal femur fracture. HTN.        TCU HPI:   Jovana is a 75 y.o. female who fell on 9/8/2020, tripped at home and presented to the hospital with a right distal femur fracture. Hospital info as partially excerpted below.     75 y.o. old female with no significant past medical history who presented after mechanical  fall at home. Right femur xray and CT right knee showed comminuted distal femoral shaft fracture 0n 9/8/2020.  Admitted for further management.     Acute bilateral PE: bilateral segmental/subsegmental, w/o RV strain, Hemodynamically stable, , troponins negative.  TTE EF 75%, nml rv size/function although not well visualized, no PHTN, no hemodynamically significant valve disease.  Precipitant likely DVT RLE in setting of right femur fracture.  -Hgb stable x24hrs with change from IV Heparin to Xarelto.  Would plan 6 months anticoagulation for provoked PE   -wean off O2 as able at TCU     Acute comminuted distal femoral shaft fracture:  POD#6 ORIF right distal femur fracture using retrograde intramedullary nail.  EBL 200ml intra-op.  Orthopedic surgery follow-up as advised.  PT/OT.       Acute postop right leg pain: controlled on current regimen.     Acute hypoxic respiratory failure:  Stable/improving.  2/2 bilateral PE and hypervolemia as well as not using I.S. and likely some atelectasis. COVID 9/8 and 9/14 negative.   -prn O2 and wean as able  -aggressive bronchial hygiene, I.S.     ABLA:  Hgb 15.3 (9/8)->9.7 (9/11)->7.9 9/12.  8.1 on 9/13 post 1U PRBC.  Hgb 7.7 on discharge.  CBC at TCU as advised.  Overall stable over past 36hrs.  Hemodynamically stable.     Sinus tachycardia: improved/stable.  multifactorial and reactive from acute medical issues.      Uncomplicated UTI: UC pansensitive e.  "coli  -complete cefdinir as prescribed     Constipation  -miralax, senna, mobilize.      Leukocytosis: reactive. 13.8 on d/c.  procalcitonin negative. Afebrile.  Being treated for UTI on sensitive regimen, no PNA symptoms, negative COVID 9/8 and 9/14.  I presume it's related to known PE and most probable a RLE DVT which is already actively being treated.  No other s/s of infection elsewhere.  CBC at TCU as advised.  Aggressive I.S. for atelectasis.     Overall stabilized and discharged to TCU on 9/15/2020 for PT, OT, nursing cares, medical management and monitoring.       Today:  She saw ortho 9/29/2020, staples removed, continue TTWB, RTC 10/13/2020. There was an apparent mix up with the appt and it was not 10/13/2020 but was 10/15/2020. The referral form came back indicating \"stable appearing right distal femur fracture. Incisions healing well.\" Orders to begin sequential weight bearing starting with 25% on 10/19/2020, then 50% on 10/26/2020 and 75% on 11/2/2020. RTC 4 weeks. She started some WB yesterday per the protocol and reports a little achiness in knee but going okay. For BP, currently on amlodipine 5 mg and metoprolol 50 two times a day. Will increase metoprolol to 75 mg two times a day, BPs are better. Range of systolics 135-185 though usually 140-150s. HRs 70-80s, occ 90+. She had Renal US 9/28/2020 showing no evidence of BETTYE induced HTN. She has been asymptomatic, tolerating meds. She is on xarelto due to PE. No SOB or hypoxia, No chest pain or palpitations. She did have ABLA which required transfusion in the hospital. Last check on 10/13/2020 was 11.1. She completed 30 days of iron supplementation. She denies dizziness. Appetite is good. No diarrhea or constipation. No urinary sx. No fever or cough.      Past Medical History:  No significant medical history.   Denies HTN, DM, CA, lung disease.      Medications:  Current Outpatient Medications   Medication Sig     acetaminophen (TYLENOL) 500 MG tablet " Take 1,000 mg by mouth every 8 (eight) hours as needed for pain.     amLODIPine (NORVASC) 2.5 MG tablet Take 5 mg by mouth daily.      bisacodyL (DULCOLAX) 10 mg suppository Insert 1 suppository (10 mg total) into the rectum daily as needed.     cyclobenzaprine (FLEXERIL) 5 MG tablet Take 1 tablet (5 mg total) by mouth 3 (three) times a day as needed for muscle spasms.     melatonin 3 mg Tab tablet Take 1 tablet (3 mg total) by mouth at bedtime as needed.     metoprolol tartrate (LOPRESSOR) 25 MG tablet Take 50 mg by mouth 2 (two) times a day.      oxyCODONE (ROXICODONE) 5 MG immediate release tablet Take 1-2 tablets (5-10 mg total) by mouth every 4 (four) hours as needed (5mg for pain 5-7.  10mg for pain 8-10).     polyethylene glycol (MIRALAX) 17 gram packet Take 1 packet (17 g total) by mouth daily. Hold for 3 or more loose stools per day     rivaroxaban ANTICOAGULANT (XARELTO) 20 mg tablet Take 1 tablet (20 mg total) by mouth daily with supper.     senna-docusate (PERICOLACE) 8.6-50 mg tablet Take 2 tablets by mouth 2 (two) times a day. Hold for 3 or more loose stools per day       Physical Exam:   Note: COVID-19 pandemic precautions in place. Physical exam performed with social distancing considerations.  General: Patient is alert pleasant female, no distress.   Vitals: /79, 136/81, Temp 97.8, Pulse 73, RR 18, O2 sat 95%RA.  HEENT: Head is NCAT. Eyes show no injection or icterus. Nares negative. Oropharynx well hydrated.  Neck: Supple. No tenderness or adenopathy. No JVD.  Lungs: Non labored respirations.  Abdomen: Soft, no tenderness. No guarding rebound or rigidity.  : Deferred.  Extremities: Mild right knee swelling.   Skin: Surgical incisions healed.  Psych: Mood appears good.      Labs:  Component      Latest Ref Rng & Units 9/8/2020 9/9/2020 9/10/2020 9/11/2020   WBC      4.0 - 11.0 thou/uL 17.6 (H) 10.2 8.2 11.7 (H)   RBC      3.80 - 5.40 mill/uL 5.09 4.18 3.38 (L) 3.24 (L)   Hemoglobin      12.0  - 16.0 g/dL 15.3 12.4 10.2 (L) 9.7 (L)   Hematocrit      35.0 - 47.0 % 45.8 37.4 31.6 (L) 30.0 (L)   MCV      80 - 100 fL 90 90 94 93   MCH      27.0 - 34.0 pg 30.1 29.7 30.2 29.9   MCHC      32.0 - 36.0 g/dL 33.4 33.2 32.3 32.3   RDW      11.0 - 14.5 % 13.6 13.8 14.0 14.0   Platelets      140 - 440 thou/uL 323 285 194 202   MPV      8.5 - 12.5 fL 10.5 10.3 10.4 10.9     Component      Latest Ref Rng & Units 9/12/2020 9/13/2020 9/14/2020 9/15/2020   WBC      4.0 - 11.0 thou/uL 11.3 (H) 11.6 (H) 13.0 (H) 13.8 (H)   RBC      3.80 - 5.40 mill/uL 2.63 (L) 2.65 (L) 2.65 (L) 2.55 (L)   Hemoglobin      12.0 - 16.0 g/dL 7.9 (L) 8.1 (L) 8.0 (L) 7.7 (L)   Hematocrit      35.0 - 47.0 % 24.2 (L) 24.1 (L) 24.4 (L) 23.7 (L)   MCV      80 - 100 fL 92 91 92 93   MCH      27.0 - 34.0 pg 30.0 30.6 30.2 30.2   MCHC      32.0 - 36.0 g/dL 32.6 33.6 32.8 32.5   RDW      11.0 - 14.5 % 14.0 14.2 14.6 (H) 15.0 (H)   Platelets      140 - 440 thou/uL 219 247 280 309   MPV      8.5 - 12.5 fL 11.0 10.7 10.3 10.3       Results for orders placed or performed during the hospital encounter of 09/08/20   Basic Metabolic Panel   Result Value Ref Range    Sodium 138 136 - 145 mmol/L    Potassium 4.0 3.5 - 5.0 mmol/L    Chloride 102 98 - 107 mmol/L    CO2 26 22 - 31 mmol/L    Anion Gap, Calculation 10 5 - 18 mmol/L    Glucose 110 70 - 125 mg/dL    Calcium 8.0 (L) 8.5 - 10.5 mg/dL    BUN 19 8 - 28 mg/dL    Creatinine 0.60 0.60 - 1.10 mg/dL    GFR MDRD Af Amer >60 >60 mL/min/1.73m2    GFR MDRD Non Af Amer >60 >60 mL/min/1.73m2     Lab Results   Component Value Date    HGB 8.1 (L) 09/17/2020     Lab Results   Component Value Date    WBC 12.2 (H) 09/17/2020    HGB 8.1 (L) 09/17/2020    HCT 25.3 (L) 09/17/2020    MCV 96 09/17/2020     (H) 09/17/2020     Lab Results   Component Value Date    HGB 11.1 (L) 10/13/2020       Renal US 9/28/2020 (PPX):  No BETTYE.   No hydronephrosis or shadowing calculi.      Assessment/Plan:  1. Right distal femur fracture.  Sustained in a fall on 9/8/2020. S/p ORIF with intramedullary nail on 9/9/2020. TTWB. Ortho visits 9/29/2020, 10/15/2020. Started WB yesterday 10/19/2020 with 25% and will progress weekly from there. RTC ortho 4 weeks from last visit.   2. Bilateral post op acute PE. On xarelto x 6 months. No chest pain or hypoxia.   3. ABLA. Sec to surgery. Received transfusion. Last hgb on 10/13/2020 was 11.1. Completed 30 days of iron.  4. HTN. On metoprolol and amlodipine. Metoprolol increased today to 75 mg two times a day, continue 5 mg amlodipine.          Electronically signed by: Kiara Mares MD

## 2021-06-12 NOTE — PROGRESS NOTES
[facility]    Facility:   [facility] [[id number]]   Code Status: DNR      CHIEF COMPLAINT/REASON FOR VISIT:  Chief Complaint   Patient presents with     Problem Visit     F/U blood when wiping        HISTORY:      HPI: Jovana is a 75 y.o. female undergoing physical and occupational therapy at [facility]   She was hospitalized from 9/8-9/15/20. with no significant past medical history who presented after mechanical  fall at home. Right femur xray and CT right knee showed comminuted distal femoral shaft fracture. She underwent  ORIF right distal femur fracture using retrograde intramedullary nail. She also developed acute bilateral PE's and is on Xarelto.     Today she is seen to review blood pressure and reports of scant bleeding when she wipes.  Her blood pressures were reviewed and continue to be elevated.  She is currently on metoprolol and Norvasc 2.5 mg.  Her Norvasc increased to 5 mg today.  She has been having bleeding when she wipes  which is light pink and going on for approximately 2 days per her report.  It does not appear to be coming from the rectum.  She did wipe the front of her vagina this morning and there was blood noted on the tissue which was a light pink.  She denies hemorrhoids. Will  check a hemoglobin and UA along with  a GYN consult.  Patient reports she does not have a GYN doctor because she does not go to the doctors   She  Denies fever, chills,Denies any chest pain,headaches,lightheadedness, dizziness,   shortness of breath, or cough. Appetite is good. Denies any GERD symptoms.   Denies any difficulty with swallowing,Denies any abdominal pain, constipation or loose stools. No insomnia. No active bleeding. She continues to be TTWB.  Her pain is controlled and she will follow-up with orthopedics on 10/13/2020.    Past Medical History:   Diagnosis Date     Secondary hypertension 9/21/2020             Family History   Problem  Relation Age of Onset     Heart disease Neg Hx      Social History     Socioeconomic History     Marital status:      Spouse name: Not on file     Number of children: Not on file     Years of education: Not on file     Highest education level: Not on file   Occupational History     Not on file   Social Needs     Financial resource strain: Not on file     Food insecurity     Worry: Not on file     Inability: Not on file     Transportation needs     Medical: Not on file     Non-medical: Not on file   Tobacco Use     Smoking status: Former Smoker     Smokeless tobacco: Never Used   Substance and Sexual Activity     Alcohol use: Yes     Alcohol/week: 1.0 standard drinks     Types: 1 Shots of liquor per week     Frequency: 2-4 times a month     Drinks per session: 1 or 2     Binge frequency: Never     Drug use: Never     Sexual activity: Not on file   Lifestyle     Physical activity     Days per week: Not on file     Minutes per session: Not on file     Stress: Not on file   Relationships     Social connections     Talks on phone: Not on file     Gets together: Not on file     Attends Buddhism service: Not on file     Active member of club or organization: Not on file     Attends meetings of clubs or organizations: Not on file     Relationship status: Not on file     Intimate partner violence     Fear of current or ex partner: Not on file     Emotionally abused: Not on file     Physically abused: Not on file     Forced sexual activity: Not on file   Other Topics Concern     Incontinent Not Asked     Toileting independently Not Asked     Cognitive impairment Not Asked     Vision impairment Not Asked     Hearing impairment Not Asked     Dentures Not Asked   Social History Narrative     Not on file         Review of Systems   Constitutional: Negative for activity change, appetite change, fatigue and fever.   HENT: Negative for congestion.    Respiratory: Negative for cough, shortness of breath and wheezing.     Cardiovascular: Negative for chest pain and leg swelling.   Gastrointestinal: Negative for abdominal distention, abdominal pain, constipation, diarrhea and nausea.   Genitourinary: Negative for dysuria.   Musculoskeletal: Positive for arthralgias. Negative for back pain.   Skin: Positive for wound. Negative for color change.   Neurological: Negative for dizziness.   Psychiatric/Behavioral: Negative for agitation, behavioral problems and confusion.       Vitals:    10/12/20 0827   BP: 163/90   Pulse: 78   Resp: 18   Temp: 98.5  F (36.9  C)   SpO2: 95%   Weight: (!) 250 lb 6.4 oz (113.6 kg)       Physical Exam  Constitutional:       Appearance: She is well-developed.   HENT:      Head: Normocephalic.   Eyes:      Conjunctiva/sclera: Conjunctivae normal.   Neck:      Musculoskeletal: Normal range of motion.   Cardiovascular:      Rate and Rhythm: Normal rate and regular rhythm.      Heart sounds: Normal heart sounds. No murmur.   Pulmonary:      Effort: No respiratory distress.      Breath sounds: Normal breath sounds. No wheezing or rales.   Abdominal:      General: Bowel sounds are normal. There is no distension.      Palpations: Abdomen is soft.      Tenderness: There is no abdominal tenderness.   Genitourinary:     Comments: Vaginal bleeding noted  Musculoskeletal:      Right lower leg: Edema present.      Comments: Decreased RLE   Skin:     General: Skin is warm.      Comments: 3 hip incisions right lateral thigh. Staples have been removed.    Neurological:      Mental Status: She is alert and oriented to person, place, and time.   Psychiatric:         Behavior: Behavior normal.           LABS:   Recent Results (from the past 240 hour(s))   COVID-19 Virus PCR MRF    Specimen: Respiratory   Result Value Ref Range    COVID-19 VIRUS SPECIMEN SOURCE Nasopharyngeal     2019-nCOV Not Detected      Current Outpatient Medications   Medication Sig     acetaminophen (TYLENOL) 500 MG tablet Take 1,000 mg by mouth every 8  (eight) hours as needed for pain.     amLODIPine (NORVASC) 2.5 MG tablet Take 2.5 mg by mouth daily.     bisacodyL (DULCOLAX) 10 mg suppository Insert 1 suppository (10 mg total) into the rectum daily as needed.     cyclobenzaprine (FLEXERIL) 5 MG tablet Take 1 tablet (5 mg total) by mouth 3 (three) times a day as needed for muscle spasms.     ferrous sulfate 325 (65 FE) MG tablet Take 1 tablet by mouth daily with breakfast.     melatonin 3 mg Tab tablet Take 1 tablet (3 mg total) by mouth at bedtime as needed.     metoprolol tartrate (LOPRESSOR) 25 MG tablet Take 25 mg by mouth 2 (two) times a day.     oxyCODONE (ROXICODONE) 5 MG immediate release tablet Take 1-2 tablets (5-10 mg total) by mouth every 4 (four) hours as needed (5mg for pain 5-7.  10mg for pain 8-10).     polyethylene glycol (MIRALAX) 17 gram packet Take 1 packet (17 g total) by mouth daily. Hold for 3 or more loose stools per day     rivaroxaban ANTICOAGULANT (XARELTO) 20 mg tablet Take 1 tablet (20 mg total) by mouth daily with supper.     senna-docusate (PERICOLACE) 8.6-50 mg tablet Take 2 tablets by mouth 2 (two) times a day. Hold for 3 or more loose stools per day     ASSESSMENT:      ICD-10-CM    1. Secondary hypertension  I15.9    2. Vaginal bleeding  N93.9        PLAN:      Vaginal bleeding check CBC, UA, and GYN consult    ORIF right femur PT OT, pain control,  follow-up with orthopedics as scheduled    Acute blood loss anemia hemoglobin 8.1 on 9/17/2020, on ferrous sulfate, monitor labs    Hypertension metoprolol tartrate to 25 mg twice daily, increase Norvasc to 5 mg daily    Anticoagulation on Xarelto    Insomnia continue melatonin    Pain management on extra strength Tylenol and oxycodone as needed    Electronically signed by: Jess Presley CNP

## 2021-06-13 NOTE — TELEPHONE ENCOUNTER
Orders being requested: Physical therapy 2 x 3 weeks and 1 x 3 weeks   Reason service is needed/diagnosis: lower extremity strengthening, knee range motion, balance training, gait and transfer.   When are orders needed by: as soon as possible   Where to send Orders: Phone:  [phone]  Okay to leave detailed message?  Yes

## 2021-06-13 NOTE — TELEPHONE ENCOUNTER
This should not have come to our pools as patient has never seen any of our providers [place] or [address]. Caller notified of this.

## 2021-06-13 NOTE — PROGRESS NOTES
Internal Medicine Office Visit- TELEPHONE VISIT  [facility]   Patient Name: Jovana Clinton  Patient Age: 75 y.o.  YOB: 1945  MRN: [id number]    Date of Visit: 2020  Reason for Telephone Visit:   Chief Complaint   Patient presents with     Medication Management       Assessment / Plan / Medical Decision Makin. Essential hypertension  - Recommend an office visit follow up to repeat BP check in 3-6 months. Patient is okay to establish care with me at this visit   - metoprolol tartrate (LOPRESSOR) 100 MG tablet; Take 1 tablet (100 mg total) by mouth 2 (two) times a day.  Dispense: 180 tablet; Refill: 1  - amLODIPine (NORVASC) 5 MG tablet; Take 1 tablet (5 mg total) by mouth daily.  Dispense: 90 tablet; Refill: 1    2. Closed fracture of distal end of right femur with routine healing, unspecified fracture morphology, subsequent encounter. S/p ORIF 2020  - doing well with home health PT. Managing pain with OTC medication       Telephone visit duration: 17 minutes     Health Maintenance Review  Health Maintenance   Topic Date Due     HEPATITIS C SCREENING  1945     DEXA SCAN  1945     TD 18+ HE  1961     LIPID  1988     ZOSTER VACCINES (1 of 2) 1993     MEDICARE ANNUAL WELLNESS VISIT  2008     Pneumococcal Vaccine: 65+ Years (1 of 1 - PPSV23) 2008     FALL RISK ASSESSMENT  2008     INFLUENZA VACCINE RULE BASED (1) 2020     ADVANCE CARE PLANNING  2025     Pneumococcal Vaccine: Pediatrics (0 to 5 Years) and At-Risk Patients (6 to 64 Years)  Aged Out     HEPATITIS B VACCINES  Aged Out         I have discontinued Jovana Clinton's rivaroxaban ANTICOAGULANT, polyethylene glycol, senna-docusate, bisacodyL, oxyCODONE, melatonin, and cyclobenzaprine. I am also having her start on metoprolol tartrate and amLODIPine. Additionally, I am having her maintain her metoprolol tartrate, acetaminophen, and amLODIPine.       HPI:  Jovana Clinton is 75 y.o. year old and was contacted today for a telephone visit. She has not had a PCP for many years. She was hospitalized from 9/8-0.15 for a comminuted distal femoral shaft fracture. She underwent  ORIF right distal femur fracture using retrograde intramedullary nail. She also developed acute bilateral PE's postoperatively and was on Xarelto for 3 months and then this was discontinued while she was in the TCU.  She was admitted to TCU where she stayed until 12/19. She is taking acetaminophen 1000 mg three times a day and her pain is very well controlled. She still has some soreness when she walks a longer distance.     HTN was reviewed, her readings range from 127-150s/74-90, typically readings are higher before she takes her medication. This was a new diagnosis for her since the hospitalization. No lightheadedness or dizziness. No chest pains.     Review of Systems:  As in HPI       Current Scheduled Meds:  Outpatient Encounter Medications as of 12/22/2020   Medication Sig Dispense Refill     acetaminophen (TYLENOL) 500 MG tablet Take 1,000 mg by mouth every 8 (eight) hours as needed for pain.       amLODIPine (NORVASC) 2.5 MG tablet Take 5 mg by mouth daily.        metoprolol tartrate (LOPRESSOR) 25 MG tablet Take 100 mg by mouth 2 (two) times a day.        [DISCONTINUED] bisacodyL (DULCOLAX) 10 mg suppository Insert 1 suppository (10 mg total) into the rectum daily as needed.  0     [DISCONTINUED] cyclobenzaprine (FLEXERIL) 5 MG tablet Take 1 tablet (5 mg total) by mouth 3 (three) times a day as needed for muscle spasms. 9 tablet 0     [DISCONTINUED] melatonin 3 mg Tab tablet Take 1 tablet (3 mg total) by mouth at bedtime as needed.  0     [DISCONTINUED] oxyCODONE (ROXICODONE) 5 MG immediate release tablet Take 1-2 tablets (5-10 mg total) by mouth every 4 (four) hours as needed (5mg for pain 5-7.  10mg for pain 8-10). 13 tablet 0     [DISCONTINUED] polyethylene glycol (MIRALAX) 17  gram packet Take 1 packet (17 g total) by mouth daily. Hold for 3 or more loose stools per day  0     [DISCONTINUED] rivaroxaban ANTICOAGULANT (XARELTO) 20 mg tablet Take 1 tablet (20 mg total) by mouth daily with supper.  0     [DISCONTINUED] senna-docusate (PERICOLACE) 8.6-50 mg tablet Take 2 tablets by mouth 2 (two) times a day. Hold for 3 or more loose stools per day  0     amLODIPine (NORVASC) 5 MG tablet Take 1 tablet (5 mg total) by mouth daily. 90 tablet 1     metoprolol tartrate (LOPRESSOR) 100 MG tablet Take 1 tablet (100 mg total) by mouth 2 (two) times a day. 180 tablet 1     No facility-administered encounter medications on file as of 12/22/2020.      Post Discharge Medication Reconciliation Status: discharge medications reconciled, continue medications without change    Past Medical History:   Diagnosis Date     Other pulmonary embolism without acute cor pulmonale (H) 09/2020    provoked. Postoperative after ORIF     Secondary hypertension 09/21/2020     Past Surgical History:   Procedure Laterality Date     CYST REMOVAL      ear     ORIF FEMUR FRACTURE Right 9/9/2020    Procedure: OPEN REDUCTION INTERNAL FIXATION, FRACTURE, FEMUR;  Surgeon: Ad Blanca DO;  Location: [facility];  Service: Orthopedics     Social History     Tobacco Use     Smoking status: Former Smoker     Smokeless tobacco: Never Used   Substance Use Topics     Alcohol use: Yes     Alcohol/week: 1.0 standard drinks     Types: 1 Shots of liquor per week     Frequency: 2-4 times a month     Drinks per session: 1 or 2     Binge frequency: Never     Drug use: Never       Objective / Physical Examination:     Insight is good. Thought process is logical. Patient is speaking in full sentences.     No orders of the defined types were placed in this encounter.  Followup: Return in about 3 months (around 3/22/2021) for Annual physical. earlier if needed.

## 2021-06-13 NOTE — PROGRESS NOTES
[facility]    Facility:   [facility] [[id number]]   Code Status: FULL CODE  PCP: [facility]   Phone: [phone]   Fax: None      CHIEF COMPLAINT/REASON FOR VISIT:  Chief Complaint   Patient presents with     Discharge Summary       HISTORY COURSE:  Jovana is a 75 y.o. female undergoing physical and occupational therapy at [facility]. She is with past medical history of  Hypertension.  who presented after mechanical  fall at home. Right femur xray and CT right knee showed comminuted distal femoral shaft fracture. She underwent  ORIF right distal femur fracture using retrograde intramedullary nail. She also developed acute bilateral PE's and is on Xarelto.      She was hospitalized from 9/8-9/15/20.      Today she is seen for a face-to-face for discharge.  She will discharge to home on 11/19/2020 with current medications and treatments she will have PT OT home health aide and RN.  She is walking 80 feet and is able to stand for 6 minutes.  She is contact-guard for transfers.  She will be returning home where she lives with her .  Her incision is healing well.  Her blood pressure medications have been adjusted during her TCU stay.    She  Denies fever, chills,Denies any chest pain,headaches,lightheadedness, dizziness,   shortness of breath, or cough. Appetite is good. Denies any GERD symptoms.   Denies any difficulty with swallowing,Denies any abdominal pain, constipation or loose stools. No insomnia. No active bleeding. She increased  to 75% weight bearing status on 11/2/20.  Her pain is controlled.     Review of Systems  Constitutional: Positive for activity change. Negative for appetite change, fatigue and fever.    75% weight bearing on 11/2/20   HENT: Negative for congestion.    Respiratory: Negative for cough, shortness of breath and wheezing.    Cardiovascular: Negative for chest pain and leg swelling.   Gastrointestinal:  Negative for abdominal distention, abdominal pain, constipation, diarrhea and nausea.   Genitourinary: Negative for dysuria.   Musculoskeletal: Positive for arthralgias. Negative for back pain.   Skin: Positive for wound. Negative for color change.   Neurological: Negative for dizziness.   Psychiatric/Behavioral: Negative for agitation, behavioral problems and confusion.   Vitals:    11/18/20 1140   BP: 158/82   Pulse: 74   Resp: 18   Temp: 98.2  F (36.8  C)   SpO2: 95%   Weight: (!) 246 lb 1.6 oz (111.6 kg)       Physical Exam  Constitutional:       Appearance: She is well-developed.   HENT:      Head: Normocephalic.   Eyes:      Conjunctiva/sclera: Conjunctivae normal.   Neck:      Musculoskeletal: Normal range of motion.   Cardiovascular:      Rate and Rhythm: Normal rate and regular rhythm.      Heart sounds: Normal heart sounds. No murmur.   Pulmonary:      Effort: No respiratory distress.      Breath sounds: Normal breath sounds. No wheezing or rales.   Abdominal:      General: Bowel sounds are normal. There is no distension.      Palpations: Abdomen is soft.      Tenderness: There is no abdominal tenderness.   Genitourinary:   no issues reported.   Musculoskeletal:      Right lower leg: Edema present.      Comments: Decreased RLE   Skin:     General: Skin is warm.      Comments: 3 hip incisions right lateral thigh. Staples have been removed.  healing well.   Neurological:      Mental Status: She is alert and oriented to person, place, and time.   Psychiatric:         Behavior: Behavior normal.   MEDICATION LIST:  Current Outpatient Medications   Medication Sig     acetaminophen (TYLENOL) 500 MG tablet Take 1,000 mg by mouth every 8 (eight) hours as needed for pain.     amLODIPine (NORVASC) 2.5 MG tablet Take 5 mg by mouth daily.      bisacodyL (DULCOLAX) 10 mg suppository Insert 1 suppository (10 mg total) into the rectum daily as needed.     cyclobenzaprine (FLEXERIL) 5 MG tablet Take 1 tablet (5 mg total)  by mouth 3 (three) times a day as needed for muscle spasms.     melatonin 3 mg Tab tablet Take 1 tablet (3 mg total) by mouth at bedtime as needed.     metoprolol tartrate (LOPRESSOR) 25 MG tablet Take 100 mg by mouth 2 (two) times a day.      oxyCODONE (ROXICODONE) 5 MG immediate release tablet Take 1-2 tablets (5-10 mg total) by mouth every 4 (four) hours as needed (5mg for pain 5-7.  10mg for pain 8-10).     polyethylene glycol (MIRALAX) 17 gram packet Take 1 packet (17 g total) by mouth daily. Hold for 3 or more loose stools per day     rivaroxaban ANTICOAGULANT (XARELTO) 20 mg tablet Take 1 tablet (20 mg total) by mouth daily with supper.     senna-docusate (PERICOLACE) 8.6-50 mg tablet Take 2 tablets by mouth 2 (two) times a day. Hold for 3 or more loose stools per day       DISCHARGE DIAGNOSIS:    ICD-10-CM    1. Secondary hypertension  I15.9    2. Closed fracture of distal end of right femur with routine healing, unspecified fracture morphology, subsequent encounter  S72.401D    3. Acute blood loss anemia  D62        MEDICAL EQUIPMENT NEEDS  [facility] Supplied     DISCHARGE PLAN/FACE TO FACE:  I certify that services are/were furnished while this patient was under the care of a physician and that a physician or an allowed non-physician practitioner (NPP), had a face-to-face encounter that meets the physician face-to-face encounter requirements. The encounter was in whole, or in part, related to the primary reason for home health. The patient is confined to his/her home and needs intermittent skilled nursing, physical therapy, speech-language pathology, or the continued need for occupational therapy. A plan of care has been established by a physician and is periodically reviewed by a physician.  Date of Face-to-Face Encounter: 11/18/20    I certify that, based on my findings, the following services are medically necessary home health services:PT/OT/HHA and RN     My clinical findings support the need for the  above skilled services because: PT OT for continued strength and endurance following right femur fracture, home health aide to assist with activities of daily living, RN for vital signs, medication management.    This patient is homebound because: She is deconditioned and easily fatigued following right femur fracture.      The patient is, or has been, under my care and I have initiated the establishment of the plan of care. This patient will be followed by a physician who will periodically review the plan of care.    Schedule follow up visit with primary care provider within 7 days to reestablish care.    Electronically signed by: Jess Presley CNP

## 2021-06-13 NOTE — PROGRESS NOTES
[facility]    Facility:   [facility] [[id number]]   Code Status: FULL CODE       Chief Complaint   Patient presents with     Follow Up     TCU 11/12/2020.       TCU HPI:   Jovana is a 75 y.o. female who fell on 9/8/2020, tripped at home and presented to the hospital with a right distal femur fracture. Hospital info as partially excerpted below.     75 y.o. old female with no significant past medical history who presented after mechanical  fall at home. Right femur xray and CT right knee showed comminuted distal femoral shaft fracture 0n 9/8/2020.  Admitted for further management.     Acute bilateral PE: bilateral segmental/subsegmental, w/o RV strain, Hemodynamically stable, , troponins negative.  TTE EF 75%, nml rv size/function although not well visualized, no PHTN, no hemodynamically significant valve disease.  Precipitant likely DVT RLE in setting of right femur fracture.  -Hgb stable x24hrs with change from IV Heparin to Xarelto.  Would plan 6 months anticoagulation for provoked PE   -wean off O2 as able at TCU     Acute comminuted distal femoral shaft fracture:  POD#6 ORIF right distal femur fracture using retrograde intramedullary nail.  EBL 200ml intra-op.  Orthopedic surgery follow-up as advised.  PT/OT.       Acute postop right leg pain: controlled on current regimen.     Acute hypoxic respiratory failure:  Stable/improving.  2/2 bilateral PE and hypervolemia as well as not using I.S. and likely some atelectasis. COVID 9/8 and 9/14 negative.   -prn O2 and wean as able  -aggressive bronchial hygiene, I.S.     ABLA:  Hgb 15.3 (9/8)->9.7 (9/11)->7.9 9/12.  8.1 on 9/13 post 1U PRBC.  Hgb 7.7 on discharge.  CBC at TCU as advised.  Overall stable over past 36hrs.  Hemodynamically stable.     Sinus tachycardia: improved/stable.  multifactorial and reactive from acute medical issues.      Uncomplicated UTI: UC pansensitive e. coli  -complete cefdinir as  "prescribed     Constipation  -miralax, senna, mobilize.      Leukocytosis: reactive. 13.8 on d/c.  procalcitonin negative. Afebrile.  Being treated for UTI on sensitive regimen, no PNA symptoms, negative COVID 9/8 and 9/14.  I presume it's related to known PE and most probable a RLE DVT which is already actively being treated.  No other s/s of infection elsewhere.  CBC at TCU as advised.  Aggressive I.S. for atelectasis.     Overall stabilized and discharged to TCU on 9/15/2020 for PT, OT, nursing cares, medical management and monitoring.       Today:  She saw ortho 9/29/2020, staples removed, continue TTWB. Follow up on 10/15/2020: \"stable appearing right distal femur fracture. Incisions healing well.\" Orders to begin sequential weight bearing starting with 25% on 10/19/2020, then 50% on 10/26/2020 and 75% on 11/2/2020. RTC 4 weeks. She is now full weight bearing starting 11/9/2020 and saw ortho today. NNO, RTC 6-8 weeks. She was told some increased discomfort in right knee is likely related to DJD.    For HTN, on amlodipine 5 mg and metoprolol 75 two times a day. BPs continue to show slow improvement, today will increase metoprolol to 100 two times a day. Earlier in TCU stay had tachycardia, resolved. She has been asymptomatic. She is on xarelto due to PE. No SOB or hypoxia, No chest pain or palpitations or dizziness. She did have ABLA which required transfusion in the hospital. Last check on 10/13/2020 was 11.1. She completed 30 days of iron supplementation. Appetite is good. No diarrhea or constipation. No urinary sx. No fever or cough.    She had a few episodes of some bleeding noted, appeared vaginal. No abdominal pain. No gyn hx of concern. She was referred to gyn and had consult on 10/29/2020 at which time a transvaginal US was done showing thickened endometrium and left ovarian cyst. Endometrial biopsy was taken. She reports she received a call with neg biopsy results. She states she has not had any further " bleeding. She will see gyn again if further concerns.      Past Medical History:  No significant medical history.   Denies HTN, DM, CA, lung disease.      Medications:  Current Outpatient Medications   Medication Sig     acetaminophen (TYLENOL) 500 MG tablet Take 1,000 mg by mouth every 8 (eight) hours as needed for pain.     amLODIPine (NORVASC) 2.5 MG tablet Take 5 mg by mouth daily.      bisacodyL (DULCOLAX) 10 mg suppository Insert 1 suppository (10 mg total) into the rectum daily as needed.     cyclobenzaprine (FLEXERIL) 5 MG tablet Take 1 tablet (5 mg total) by mouth 3 (three) times a day as needed for muscle spasms.     melatonin 3 mg Tab tablet Take 1 tablet (3 mg total) by mouth at bedtime as needed.     metoprolol tartrate (LOPRESSOR) 25 MG tablet Take 75 mg by mouth 2 (two) times a day.      oxyCODONE (ROXICODONE) 5 MG immediate release tablet Take 1-2 tablets (5-10 mg total) by mouth every 4 (four) hours as needed (5mg for pain 5-7.  10mg for pain 8-10).     polyethylene glycol (MIRALAX) 17 gram packet Take 1 packet (17 g total) by mouth daily. Hold for 3 or more loose stools per day     rivaroxaban ANTICOAGULANT (XARELTO) 20 mg tablet Take 1 tablet (20 mg total) by mouth daily with supper.     senna-docusate (PERICOLACE) 8.6-50 mg tablet Take 2 tablets by mouth 2 (two) times a day. Hold for 3 or more loose stools per day       Physical Exam:   Note: COVID-19 pandemic precautions in place. Physical exam performed with social distancing considerations.  General: Patient is alert pleasant female, no distress.   Vitals: /96, Temp 97.8, Pulse 65, RR 18, O2 sat 95%RA.  HEENT: Head is NCAT. Eyes show no injection or icterus. Nares negative. Oropharynx well hydrated.  Neck: No JVD.  Lungs: Non labored respirations.  : Deferred.  Extremities: Mild right knee swelling.   Skin: Surgical incisions healed.  Psych: Mood appears good.      Labs:  Component      Latest Ref Rng & Units 9/8/2020 9/9/2020  9/10/2020 9/11/2020   WBC      4.0 - 11.0 thou/uL 17.6 (H) 10.2 8.2 11.7 (H)   RBC      3.80 - 5.40 mill/uL 5.09 4.18 3.38 (L) 3.24 (L)   Hemoglobin      12.0 - 16.0 g/dL 15.3 12.4 10.2 (L) 9.7 (L)   Hematocrit      35.0 - 47.0 % 45.8 37.4 31.6 (L) 30.0 (L)   MCV      80 - 100 fL 90 90 94 93   MCH      27.0 - 34.0 pg 30.1 29.7 30.2 29.9   MCHC      32.0 - 36.0 g/dL 33.4 33.2 32.3 32.3   RDW      11.0 - 14.5 % 13.6 13.8 14.0 14.0   Platelets      140 - 440 thou/uL 323 285 194 202   MPV      8.5 - 12.5 fL 10.5 10.3 10.4 10.9     Component      Latest Ref Rng & Units 9/12/2020 9/13/2020 9/14/2020 9/15/2020   WBC      4.0 - 11.0 thou/uL 11.3 (H) 11.6 (H) 13.0 (H) 13.8 (H)   RBC      3.80 - 5.40 mill/uL 2.63 (L) 2.65 (L) 2.65 (L) 2.55 (L)   Hemoglobin      12.0 - 16.0 g/dL 7.9 (L) 8.1 (L) 8.0 (L) 7.7 (L)   Hematocrit      35.0 - 47.0 % 24.2 (L) 24.1 (L) 24.4 (L) 23.7 (L)   MCV      80 - 100 fL 92 91 92 93   MCH      27.0 - 34.0 pg 30.0 30.6 30.2 30.2   MCHC      32.0 - 36.0 g/dL 32.6 33.6 32.8 32.5   RDW      11.0 - 14.5 % 14.0 14.2 14.6 (H) 15.0 (H)   Platelets      140 - 440 thou/uL 219 247 280 309   MPV      8.5 - 12.5 fL 11.0 10.7 10.3 10.3       Results for orders placed or performed during the hospital encounter of 09/08/20   Basic Metabolic Panel   Result Value Ref Range    Sodium 138 136 - 145 mmol/L    Potassium 4.0 3.5 - 5.0 mmol/L    Chloride 102 98 - 107 mmol/L    CO2 26 22 - 31 mmol/L    Anion Gap, Calculation 10 5 - 18 mmol/L    Glucose 110 70 - 125 mg/dL    Calcium 8.0 (L) 8.5 - 10.5 mg/dL    BUN 19 8 - 28 mg/dL    Creatinine 0.60 0.60 - 1.10 mg/dL    GFR MDRD Af Amer >60 >60 mL/min/1.73m2    GFR MDRD Non Af Amer >60 >60 mL/min/1.73m2     Lab Results   Component Value Date    WBC 12.2 (H) 09/17/2020    HGB 8.1 (L) 09/17/2020    HCT 25.3 (L) 09/17/2020    MCV 96 09/17/2020     (H) 09/17/2020     Lab Results   Component Value Date    HGB 11.1 (L) 10/13/2020       Renal US 9/28/2020 (PPX):  No BETTYE.    No hydronephrosis or shadowing calculi.      Assessment/Plan:  1. Right distal femur fracture. Sustained in a fall on 9/8/2020. S/p ORIF with intramedullary nail on 9/9/2020. Ortho visits 9/29/2020, 10/15/2020, and today 11/12/2020. She has been full WB. NNO today. RTC 6-8 weeks.   2. Bilateral post op acute PE. On xarelto x 6 months. No chest pain or hypoxia.   3. ABLA. Sec to surgery. Received transfusion. Last hgb on 10/13/2020 was 11.1. Completed 30 days of iron.  4. HTN. On metoprolol and amlodipine. Continue to monitor BPs in TCU, titrate meds as needed for control. No further tachycardia.    5. Postmenopausal bleeding. Saw gynecology on 10/29/2020 with biopsy she reports was told is neg. No further bleeding. F/u with gyn if recurs.           Electronically signed by: Kiara Mares MD

## 2021-06-13 NOTE — PROGRESS NOTES
"Jovana Clinton is a 75 y.o. female who is being evaluated via a billable telephone visit.      The patient has been notified of following:     \"This telephone visit will be conducted via a call between you and your physician/provider. We have found that certain health care needs can be provided without the need for a physical exam.  This service lets us provide the care you need with a short phone conversation.  If a prescription is necessary we can send it directly to your pharmacy.  If lab work is needed we can place an order for that and you can then stop by our lab to have the test done at a later time.    Telephone visits are billed at different rates depending on your insurance coverage. During this emergency period, for some insurers they may be billed the same as an in-person visit.  Please reach out to your insurance provider with any questions.    If during the course of the call the physician/provider feels a telephone visit is not appropriate, you will not be charged for this service.\"    Patient has given verbal consent to a Telephone visit? Yes    What phone number would you like to be contacted at? [phone]     Patient would like to receive their AVS by AVS Preference: Mail a copy.      "

## 2021-06-13 NOTE — PROGRESS NOTES
[facility]    Facility:   [facility] [[id number]]   Code Status: FULL CODE       Chief Complaint   Patient presents with     Follow Up     TCU 11/10/2020. Right femur fracture. HTN.        TCU HPI:   Jovana is a 75 y.o. female who fell on 9/8/2020, tripped at home and presented to the hospital with a right distal femur fracture. Hospital info as partially excerpted below.     75 y.o. old female with no significant past medical history who presented after mechanical  fall at home. Right femur xray and CT right knee showed comminuted distal femoral shaft fracture 0n 9/8/2020.  Admitted for further management.     Acute bilateral PE: bilateral segmental/subsegmental, w/o RV strain, Hemodynamically stable, , troponins negative.  TTE EF 75%, nml rv size/function although not well visualized, no PHTN, no hemodynamically significant valve disease.  Precipitant likely DVT RLE in setting of right femur fracture.  -Hgb stable x24hrs with change from IV Heparin to Xarelto.  Would plan 6 months anticoagulation for provoked PE   -wean off O2 as able at TCU     Acute comminuted distal femoral shaft fracture:  POD#6 ORIF right distal femur fracture using retrograde intramedullary nail.  EBL 200ml intra-op.  Orthopedic surgery follow-up as advised.  PT/OT.       Acute postop right leg pain: controlled on current regimen.     Acute hypoxic respiratory failure:  Stable/improving.  2/2 bilateral PE and hypervolemia as well as not using I.S. and likely some atelectasis. COVID 9/8 and 9/14 negative.   -prn O2 and wean as able  -aggressive bronchial hygiene, I.S.     ABLA:  Hgb 15.3 (9/8)->9.7 (9/11)->7.9 9/12.  8.1 on 9/13 post 1U PRBC.  Hgb 7.7 on discharge.  CBC at TCU as advised.  Overall stable over past 36hrs.  Hemodynamically stable.     Sinus tachycardia: improved/stable.  multifactorial and reactive from acute medical issues.      Uncomplicated UTI: UC pansensitive e.  "coli  -complete cefdinir as prescribed     Constipation  -miralax, senna, mobilize.      Leukocytosis: reactive. 13.8 on d/c.  procalcitonin negative. Afebrile.  Being treated for UTI on sensitive regimen, no PNA symptoms, negative COVID 9/8 and 9/14.  I presume it's related to known PE and most probable a RLE DVT which is already actively being treated.  No other s/s of infection elsewhere.  CBC at TCU as advised.  Aggressive I.S. for atelectasis.     Overall stabilized and discharged to TCU on 9/15/2020 for PT, OT, nursing cares, medical management and monitoring.       Today:  She saw ortho 9/29/2020, staples removed, continue TTWB. Follow up on 10/15/2020: \"stable appearing right distal femur fracture. Incisions healing well.\" Orders to begin sequential weight bearing starting with 25% on 10/19/2020, then 50% on 10/26/2020 and 75% on 11/2/2020. RTC 4 weeks. She is now full weight bearing starting 11/9/2020 and will see ortho later this week. Some increased discomfort now that she has been weight bearing. No increased swelling. No redness. No clinical concerns, will continue in therapy. For HTN, currently on amlodipine 5 mg and metoprolol 75 two times a day. BPs continue to show slow improvement. She has been asymptomatic. She is on xarelto due to PE. No SOB or hypoxia, No chest pain or palpitations or dizziness. She did have ABLA which required transfusion in the hospital. Last check on 10/13/2020 was 11.1. She completed 30 days of iron supplementation. Appetite is good. No diarrhea or constipation. No urinary sx. No fever or cough.      Past Medical History:  No significant medical history.   Denies HTN, DM, CA, lung disease.      Medications:  Current Outpatient Medications   Medication Sig     acetaminophen (TYLENOL) 500 MG tablet Take 1,000 mg by mouth every 8 (eight) hours as needed for pain.     amLODIPine (NORVASC) 2.5 MG tablet Take 5 mg by mouth daily.      bisacodyL (DULCOLAX) 10 mg suppository Insert " 1 suppository (10 mg total) into the rectum daily as needed.     cyclobenzaprine (FLEXERIL) 5 MG tablet Take 1 tablet (5 mg total) by mouth 3 (three) times a day as needed for muscle spasms.     melatonin 3 mg Tab tablet Take 1 tablet (3 mg total) by mouth at bedtime as needed.     metoprolol tartrate (LOPRESSOR) 25 MG tablet Take 75 mg by mouth 2 (two) times a day.      oxyCODONE (ROXICODONE) 5 MG immediate release tablet Take 1-2 tablets (5-10 mg total) by mouth every 4 (four) hours as needed (5mg for pain 5-7.  10mg for pain 8-10).     polyethylene glycol (MIRALAX) 17 gram packet Take 1 packet (17 g total) by mouth daily. Hold for 3 or more loose stools per day     rivaroxaban ANTICOAGULANT (XARELTO) 20 mg tablet Take 1 tablet (20 mg total) by mouth daily with supper.     senna-docusate (PERICOLACE) 8.6-50 mg tablet Take 2 tablets by mouth 2 (two) times a day. Hold for 3 or more loose stools per day       Physical Exam:   Note: COVID-19 pandemic precautions in place. Physical exam performed with social distancing considerations.  General: Patient is alert pleasant female, no distress.   Vitals: /87, Temp 98, Pulse 72, RR 18, O2 sat 97%RA.  HEENT: Head is NCAT. Eyes show no injection or icterus. Nares negative. Oropharynx well hydrated.  Neck: No JVD.  Lungs: Non labored respirations.  : Deferred.  Extremities: Mild right knee swelling.   Skin: Surgical incisions healed.  Psych: Mood appears good.      Labs:  Component      Latest Ref Rng & Units 9/8/2020 9/9/2020 9/10/2020 9/11/2020   WBC      4.0 - 11.0 thou/uL 17.6 (H) 10.2 8.2 11.7 (H)   RBC      3.80 - 5.40 mill/uL 5.09 4.18 3.38 (L) 3.24 (L)   Hemoglobin      12.0 - 16.0 g/dL 15.3 12.4 10.2 (L) 9.7 (L)   Hematocrit      35.0 - 47.0 % 45.8 37.4 31.6 (L) 30.0 (L)   MCV      80 - 100 fL 90 90 94 93   MCH      27.0 - 34.0 pg 30.1 29.7 30.2 29.9   MCHC      32.0 - 36.0 g/dL 33.4 33.2 32.3 32.3   RDW      11.0 - 14.5 % 13.6 13.8 14.0 14.0   Platelets       140 - 440 thou/uL 323 285 194 202   MPV      8.5 - 12.5 fL 10.5 10.3 10.4 10.9     Component      Latest Ref Rng & Units 9/12/2020 9/13/2020 9/14/2020 9/15/2020   WBC      4.0 - 11.0 thou/uL 11.3 (H) 11.6 (H) 13.0 (H) 13.8 (H)   RBC      3.80 - 5.40 mill/uL 2.63 (L) 2.65 (L) 2.65 (L) 2.55 (L)   Hemoglobin      12.0 - 16.0 g/dL 7.9 (L) 8.1 (L) 8.0 (L) 7.7 (L)   Hematocrit      35.0 - 47.0 % 24.2 (L) 24.1 (L) 24.4 (L) 23.7 (L)   MCV      80 - 100 fL 92 91 92 93   MCH      27.0 - 34.0 pg 30.0 30.6 30.2 30.2   MCHC      32.0 - 36.0 g/dL 32.6 33.6 32.8 32.5   RDW      11.0 - 14.5 % 14.0 14.2 14.6 (H) 15.0 (H)   Platelets      140 - 440 thou/uL 219 247 280 309   MPV      8.5 - 12.5 fL 11.0 10.7 10.3 10.3       Results for orders placed or performed during the hospital encounter of 09/08/20   Basic Metabolic Panel   Result Value Ref Range    Sodium 138 136 - 145 mmol/L    Potassium 4.0 3.5 - 5.0 mmol/L    Chloride 102 98 - 107 mmol/L    CO2 26 22 - 31 mmol/L    Anion Gap, Calculation 10 5 - 18 mmol/L    Glucose 110 70 - 125 mg/dL    Calcium 8.0 (L) 8.5 - 10.5 mg/dL    BUN 19 8 - 28 mg/dL    Creatinine 0.60 0.60 - 1.10 mg/dL    GFR MDRD Af Amer >60 >60 mL/min/1.73m2    GFR MDRD Non Af Amer >60 >60 mL/min/1.73m2     Lab Results   Component Value Date    WBC 12.2 (H) 09/17/2020    HGB 8.1 (L) 09/17/2020    HCT 25.3 (L) 09/17/2020    MCV 96 09/17/2020     (H) 09/17/2020     Lab Results   Component Value Date    HGB 11.1 (L) 10/13/2020       Renal US 9/28/2020 (PPX):  No BETTYE.   No hydronephrosis or shadowing calculi.      Assessment/Plan:  1. Right distal femur fracture. Sustained in a fall on 9/8/2020. S/p ORIF with intramedullary nail on 9/9/2020. Ortho visits 9/29/2020, 10/15/2020, pending this week 11/12/2020. Started full WB 11/9/2020 after sequential increases.  2. Bilateral post op acute PE. On xarelto x 6 months. No chest pain or hypoxia.   3. ABLA. Sec to surgery. Received transfusion. Last hgb on 10/13/2020 was  11.1. Completed 30 days of iron.  4. HTN. On metoprolol and amlodipine. Continue to monitor BPs in TCU, titrate meds as needed for control. No further tachycardia.           Electronically signed by: Kiara Mares MD

## 2021-06-13 NOTE — TELEPHONE ENCOUNTER
Verbal OK given to go ahead with PT orders as written. Does patient know I am transitioning to [place]?

## 2021-06-14 ENCOUNTER — RECORDS - HEALTHEAST (OUTPATIENT)
Dept: INTERNAL MEDICINE | Facility: CLINIC | Age: 76
End: 2021-06-14

## 2021-06-14 NOTE — TELEPHONE ENCOUNTER
Reason for Call: Request for an order or referral:    Order or referral being requested: Early PT/OT discharge orders    Date needed: as soon as possible    Has the patient been seen by the PCP for this problem? YES    Additional comments: Isabel called requesting early discharge orders from [facility] for the PT/OT services patient has been receiving.  Per Isabel, patient has met all her goals at this time therapy is no longer needed.    Phone number Patient can be reached at:  Other phone number:  [phone]    Best Time:  Any time    Can we leave a detailed message on this number?  Yes    Call taken on 12/31/2020 at 3:14 PM by Santiago Pradhan

## 2021-06-15 ENCOUNTER — COMMUNICATION - HEALTHEAST (OUTPATIENT)
Dept: INTERNAL MEDICINE | Facility: CLINIC | Age: 76
End: 2021-06-15

## 2021-06-15 ENCOUNTER — RECORDS - HEALTHEAST (OUTPATIENT)
Dept: FAMILY MEDICINE | Facility: CLINIC | Age: 76
End: 2021-06-15

## 2021-06-15 DIAGNOSIS — I10 ESSENTIAL HYPERTENSION: ICD-10-CM

## 2021-06-16 PROBLEM — S72.401D CLOSED FRACTURE OF DISTAL END OF RIGHT FEMUR WITH ROUTINE HEALING, UNSPECIFIED FRACTURE MORPHOLOGY, SUBSEQUENT ENCOUNTER: Status: ACTIVE | Noted: 2020-09-18

## 2021-06-16 PROBLEM — I10 ESSENTIAL HYPERTENSION: Status: ACTIVE | Noted: 2020-12-23

## 2021-06-16 NOTE — TELEPHONE ENCOUNTER
Letter is in patient's chart. Patient requests email but I don't believe we typically email patient information so MyChart or a letter to her home is an option.     Please let her know that I put an end date of October 2021 on the letter. She needs to be seen in the office in the next 2 months for an establish care visit and follow up of her fracture.

## 2021-06-16 NOTE — TELEPHONE ENCOUNTER
Can you please write a letter for patient to have her mail delivered to her front door and not placed in her mailbox.

## 2021-06-16 NOTE — TELEPHONE ENCOUNTER
Left generic message for patient to call back. I can scan the letter through and send it to her email. Please relay that she needs to schedule a est care with a provider

## 2021-06-16 NOTE — TELEPHONE ENCOUNTER
Pt is follow up on her 04/08/21 request for letter for post office for mail delivery.    Pt express her frustration and exasperation that she has not heard back from anyone in the clinic - pt states it is very simple and will not even take 10 minutes.  [facility] and he did not have to go through this much hassle.     Attempted several times to relay the message and the patent kept express her disgust in the process.    Pt broke her femur in September and still uses a walker.  The drive way is slanted and she does not feel stable.    Her  is totally disabled and received letter [facility].    Her grandson was staying with her but has returned back to his home and school.  He collected the mail for her previously.    The post office is requiring a letter to make an exception to deliver the mail to the door instead of the mail box.    Pt expressed her displeasure to spend money to make an appt to get a letter written.  It is very simple, all the information is already available.    Scheduled telephone encounter with Dr. Man tomorrow at Noon.    Pt would prefer to have providers number they will be calling from so she can answer the phone.

## 2021-06-16 NOTE — TELEPHONE ENCOUNTER
Reason for Call:  Form, our goal is to have forms completed with 72 hours, however, some forms may require a visit or additional information.    Type of letter, form or note:  Letter to post office/mailman    Who is the form from?: Patient    Where did the form come from: n/a    What clinic location was the form placed at?: n/a    Where the form was placed: n/a    What number is listed as a contact on the form?: [phone]       Additional comments: Patient is requesting MAGDALENE Marques to write a letter to the post office/mailman requesting them to deliver mail to her door step and not in the mailbox by the street. She uses a walker and the drive way is slanted on a hill.  She's not able to get mails due to safety and health reasons. She would like letter email to [email or web address].    Call taken on 4/8/2021 at 2:34 PM by Rao Childs

## 2021-06-16 NOTE — TELEPHONE ENCOUNTER
Her PCP is out until 4/22. Please let patient know PCP will address this on return or if she needs the letter done before then, will need to schedule a visit with another provider to discuss letter and appropriateness of the request since we are not her PCP and not familiar with her.    Dr. Man

## 2021-06-20 NOTE — LETTER
Letter by Kiara Mares MD at      Author: Kiara Mares MD Service: -- Author Type: --    Filed:  Encounter Date: 9/17/2020 Status: (Other)         Patient: Jovana Clinton   MR Number: [id number]   YOB: 1945   Date of Visit: 9/17/2020     [facility]    Facility:   [facility] [[id number]]   Code Status: FULL CODE       Chief Complaint   Patient presents with   ? Follow Up     TCU 9/17/2020. Right distal femur fx s/p surgery.        TCU HPI:   Jovana is a 75 y.o. female who fell on 9/8/2020, tripped at home and presented to the hospital with a right distal femur fracture. Hospital info as partially excerpted below.     75 y.o. old female with no significant past medical history who presented after mechanical  fall at home. Right femur xray and CT right knee showed comminuted distal femoral shaft fracture 0n 9/8/2020.  Admitted for further management.     Acute bilateral PE: bilateral segmental/subsegmental, w/o RV strain, Hemodynamically stable, , troponins negative.  TTE EF 75%, nml rv size/function although not well visualized, no PHTN, no hemodynamically significant valve disease.  Precipitant likely DVT RLE in setting of right femur fracture.  -Hgb stable x24hrs with change from IV Heparin to Xarelto.  Would plan 6 months anticoagulation for provoked PE   -wean off O2 as able at TCU     Acute comminuted distal femoral shaft fracture:  POD#6 ORIF right distal femur fracture using retrograde intramedullary nail.  EBL 200ml intra-op.  Orthopedic surgery follow-up as advised.  PT/OT.       Acute postop right leg pain: controlled on current regimen.     Acute hypoxic respiratory failure:  Stable/imrpoving.  2/2 bilateral PE and hypervolemia as well as not using I.S. and likely some atelectasis. COVID 9/8 and 9/14 negative.   -prn O2 and wean as able  -aggressive bronchial hygiene, I.S.     ABLA:  Hgb 15.3 (9/8)->9.7 (9/11)->7.9 9/12.   8.1 on 9/13 post 1U PRBC.  Hgb 7.7 on discharge.  CBC at TCU as advised.  Overall stable over past 36hrs.  Hemodynamically stable.     Sinus tachycardia: improved/stable.  multifactorial and reactive from acute medical issues.      Uncomplicated UTI: UC pansensitive e. coli  -complete cefdinir as prescribed     Constipation  -miralax, senna, mobilize.      Leukocytosis: reactive. 13.8 on d/c.  procalcitonin negative. Afebrile.  Being treated for UTI on sensitive regimen, no PNA symptoms, negative COVID 9/8 and 9/14.  I presume it's related to known PE and most probable a RLE DVT which is already actively being treated.  No other s/s of infection elsewhere.  CBC at TCU as advised.  Aggressive I.S. for atelectasis.     Overall stabilized and discharged to TCU on 9/15/2020 for PT, OT, nursing cares, medical management and monitoring.       Today:  Continues to run tachycardia, some into 130s, often low 100s. completely asymptomatic per her report. She has also had some elevated BPs, states no previous hx of HTN. No HAs, palpitations. She is tired but not short of breath. No hypoxia. Had work up including ekgs and echo in hospital, sinus tachycardia noted. Restricted to TTWB. Spasms in legs improved. She is on scheduled tylenol and has prn oxycodone. It is noted she had significantly elevated BPs when she presented to the ED, systolic over 200, contributors of acute pain and stress. Post op had bilateral PEs per CT on 9//10/2020. Sinus tachycardia also noted throughout hospital stay, noted to be improving and felt to be multifactorial due to medical events. She did have ABLA which required transfusion. Hgb upon hospital DC on 9/15/2020 was 7.7 and is 8.1 today. She will be started on iron for 30 days. She denies dizziness. Appetite is pretty good. No diarrhea or constipation. No urinary sx. No fever or cough.      Past Medical History:  No significant medical history.   Denies HTN, DM, CA, lung disease.  Lab Results    Component Value Date    HGB 8.1 (L) 09/17/2020           Medications:  Current Outpatient Medications   Medication Sig   ? acetaminophen (TYLENOL) 500 MG tablet Take 2 tablets (1,000 mg total) by mouth 3 (three) times a day for 7 days. Then three times a day PRN pain   ? bisacodyL (DULCOLAX) 10 mg suppository Insert 1 suppository (10 mg total) into the rectum daily as needed.   ? cyclobenzaprine (FLEXERIL) 5 MG tablet Take 1 tablet (5 mg total) by mouth 3 (three) times a day as needed for muscle spasms.   ? melatonin 3 mg Tab tablet Take 1 tablet (3 mg total) by mouth at bedtime as needed.   ? oxyCODONE (ROXICODONE) 5 MG immediate release tablet Take 1-2 tablets (5-10 mg total) by mouth every 4 (four) hours as needed (5mg for pain 5-7.  10mg for pain 8-10).   ? polyethylene glycol (MIRALAX) 17 gram packet Take 1 packet (17 g total) by mouth daily. Hold for 3 or more loose stools per day   ? rivaroxaban ANTICOAGULANT (XARELTO) 15 mg tablet Take 1 tablet (15 mg total) by mouth 2 (two) times a day with meals for 20 days.   ? [START ON 10/5/2020] rivaroxaban ANTICOAGULANT (XARELTO) 20 mg tablet Take 1 tablet (20 mg total) by mouth daily with supper.   ? senna-docusate (PERICOLACE) 8.6-50 mg tablet Take 2 tablets by mouth 2 (two) times a day. Hold for 3 or more loose stools per day       Physical Exam:   Note: COVID-19 pandemic precautions in place. Physical exam performed with social distancing considerations.  General: Patient is alert pleasant female, no distress.   Vitals: /70, Temp 98.4, Pulse 92, RR 18, O2 sat 94%RA.  HEENT: Head is NCAT. Eyes show no injection or icterus. Nares negative. Oropharynx well hydrated.  Neck: Supple. No tenderness or adenopathy. No JVD.  Lungs: Non labored respirations.  Abdomen: Soft, no tenderness. No guarding rebound or rigidity.  : Deferred.  Extremities: Mod right LE swelling.   Musculoskeletal: Swelling right LE.   Skin: Surgical incisions prox thigh, distal femur with  staples, ecchymoses.   Psych: Mood appears good.      Labs:  Component      Latest Ref Rng & Units 9/8/2020 9/9/2020 9/10/2020 9/11/2020   WBC      4.0 - 11.0 thou/uL 17.6 (H) 10.2 8.2 11.7 (H)   RBC      3.80 - 5.40 mill/uL 5.09 4.18 3.38 (L) 3.24 (L)   Hemoglobin      12.0 - 16.0 g/dL 15.3 12.4 10.2 (L) 9.7 (L)   Hematocrit      35.0 - 47.0 % 45.8 37.4 31.6 (L) 30.0 (L)   MCV      80 - 100 fL 90 90 94 93   MCH      27.0 - 34.0 pg 30.1 29.7 30.2 29.9   MCHC      32.0 - 36.0 g/dL 33.4 33.2 32.3 32.3   RDW      11.0 - 14.5 % 13.6 13.8 14.0 14.0   Platelets      140 - 440 thou/uL 323 285 194 202   MPV      8.5 - 12.5 fL 10.5 10.3 10.4 10.9     Component      Latest Ref Rng & Units 9/12/2020 9/13/2020 9/14/2020 9/15/2020   WBC      4.0 - 11.0 thou/uL 11.3 (H) 11.6 (H) 13.0 (H) 13.8 (H)   RBC      3.80 - 5.40 mill/uL 2.63 (L) 2.65 (L) 2.65 (L) 2.55 (L)   Hemoglobin      12.0 - 16.0 g/dL 7.9 (L) 8.1 (L) 8.0 (L) 7.7 (L)   Hematocrit      35.0 - 47.0 % 24.2 (L) 24.1 (L) 24.4 (L) 23.7 (L)   MCV      80 - 100 fL 92 91 92 93   MCH      27.0 - 34.0 pg 30.0 30.6 30.2 30.2   MCHC      32.0 - 36.0 g/dL 32.6 33.6 32.8 32.5   RDW      11.0 - 14.5 % 14.0 14.2 14.6 (H) 15.0 (H)   Platelets      140 - 440 thou/uL 219 247 280 309   MPV      8.5 - 12.5 fL 11.0 10.7 10.3 10.3       Results for orders placed or performed during the hospital encounter of 09/08/20   Basic Metabolic Panel   Result Value Ref Range    Sodium 138 136 - 145 mmol/L    Potassium 4.0 3.5 - 5.0 mmol/L    Chloride 102 98 - 107 mmol/L    CO2 26 22 - 31 mmol/L    Anion Gap, Calculation 10 5 - 18 mmol/L    Glucose 110 70 - 125 mg/dL    Calcium 8.0 (L) 8.5 - 10.5 mg/dL    BUN 19 8 - 28 mg/dL    Creatinine 0.60 0.60 - 1.10 mg/dL    GFR MDRD Af Amer >60 >60 mL/min/1.73m2    GFR MDRD Non Af Amer >60 >60 mL/min/1.73m2     Lab Results   Component Value Date    HGB 8.1 (L) 09/17/2020     Lab Results   Component Value Date    WBC 12.2 (H) 09/17/2020    HGB 8.1 (L) 09/17/2020     HCT 25.3 (L) 09/17/2020    MCV 96 09/17/2020     (H) 09/17/2020         Assessment/Plan:  1. Right distal femur fracture. Sustained in a fall on 9/8/2020. S/p ORIF using retrograde intramedullary nail on 9/9/2020. TTWB. Therapy as able. Follow up with ortho.   2. Bilateral acute PE. Post op. No evidence right heart strain. On xarelto x 6 months.   3. ABLA. Sec to surgery. Received transfusion on 9/13/2020. DC from hosp at 7.7. Recheck today is 8.1. Start iron for 30 days, ferrous sulfate 325 once daily.  4. Tachycardia. EKG ordered for today. She was noted to have this in the hospital with work up there. It has been persistent. Will start low dose metoprolol and titrate as needed and as BPs allow. She has been running elevated BPs as well with no known prior dx of HTN.   5. Hypoxic resp failure. Multifactorial. Post op PEs, hypervolemia, atelectasis. Has prn oxygen though on room air and sats ok for now. Encourage IS, monitor closely.   6. UTI. UC grew 10-50 K E coli. Will complete course of Cefdinir today. No current urinary sx. No fever or abdominal pain.   7. Leukocytosis. Presented to ED with wbc over 17, last at 12.2 today. Likely reactive, multifactorial. Treated for UTI but no other sign infection.  8. Elevated BPs. When she was in ED, BPs 212/108, 190/105, contributors acute pain, stress. Just admitted to TCU today, /72. Metoprolol started in TCU as noted.      Total time spent was greater than 35 minutes, more than 50% spent in face-to-face counseling regarding tachycardia, hospital work up, obtain EKG in TCU. Starting metoprolol, frequent monitoring of BP and HR. Consider cardiology follow up. Also continue TTWB for distal femur fracture, follow up with ortho needed. Continue anticoagulation for PE.          Electronically signed by: Kiara Mares MD

## 2021-06-20 NOTE — LETTER
Letter by Kiara Mares MD at      Author: Kiara Mares MD Service: -- Author Type: --    Filed:  Encounter Date: 10/6/2020 Status: (Other)         Patient: Jovana Clinton   MR Number: [id number]   YOB: 1945   Date of Visit: 10/6/2020     [facility]    Facility:   [facility] [[id number]]   Code Status: FULL CODE       Chief Complaint   Patient presents with   ? Follow Up     TCU 10/6/2020. Right distal femur fracture. HTN.        TCU HPI:   Jovana is a 75 y.o. female who fell on 9/8/2020, tripped at home and presented to the hospital with a right distal femur fracture. Hospital info as partially excerpted below.     75 y.o. old female with no significant past medical history who presented after mechanical  fall at home. Right femur xray and CT right knee showed comminuted distal femoral shaft fracture 0n 9/8/2020.  Admitted for further management.     Acute bilateral PE: bilateral segmental/subsegmental, w/o RV strain, Hemodynamically stable, , troponins negative.  TTE EF 75%, nml rv size/function although not well visualized, no PHTN, no hemodynamically significant valve disease.  Precipitant likely DVT RLE in setting of right femur fracture.  -Hgb stable x24hrs with change from IV Heparin to Xarelto.  Would plan 6 months anticoagulation for provoked PE   -wean off O2 as able at TCU     Acute comminuted distal femoral shaft fracture:  POD#6 ORIF right distal femur fracture using retrograde intramedullary nail.  EBL 200ml intra-op.  Orthopedic surgery follow-up as advised.  PT/OT.       Acute postop right leg pain: controlled on current regimen.     Acute hypoxic respiratory failure:  Stable/imrpoving.  2/2 bilateral PE and hypervolemia as well as not using I.S. and likely some atelectasis. COVID 9/8 and 9/14 negative.   -prn O2 and wean as able  -aggressive bronchial hygiene, I.S.     ABLA:  Hgb 15.3 (9/8)->9.7 (9/11)->7.9 9/12.  8.1  on 9/13 post 1U PRBC.  Hgb 7.7 on discharge.  CBC at TCU as advised.  Overall stable over past 36hrs.  Hemodynamically stable.     Sinus tachycardia: improved/stable.  multifactorial and reactive from acute medical issues.      Uncomplicated UTI: UC pansensitive e. coli  -complete cefdinir as prescribed     Constipation  -miralax, senna, mobilize.      Leukocytosis: reactive. 13.8 on d/c.  procalcitonin negative. Afebrile.  Being treated for UTI on sensitive regimen, no PNA symptoms, negative COVID 9/8 and 9/14.  I presume it's related to known PE and most probable a RLE DVT which is already actively being treated.  No other s/s of infection elsewhere.  CBC at TCU as advised.  Aggressive I.S. for atelectasis.     Overall stabilized and discharged to TCU on 9/15/2020 for PT, OT, nursing cares, medical management and monitoring.       Today:  She saw ortho 9/29/2020, staples removed, continue TTWB, RTC 10/13/2020. Pain under good control. Swelling much less, ROM improved at knee. On Metoprolol and amlodipine for HTN and intermittent sinus tachycardia. She denies headaches, palpitations, shortness of breath. No hypoxia. On xarelto due to post op PE. She did have ABLA which required transfusion. Hgb upon hospital DC on 9/15/2020 was 7.7 and is 8.1 follow up in TCU on 9/17/2020. She is on iron. She denies dizziness. Appetite is good. No diarrhea or constipation. No urinary sx. No fever or cough.      Past Medical History:  No significant medical history.   Denies HTN, DM, CA, lung disease.      Medications:  Current Outpatient Medications   Medication Sig   ? acetaminophen (TYLENOL) 500 MG tablet Take 1,000 mg by mouth every 8 (eight) hours as needed for pain.   ? amLODIPine (NORVASC) 2.5 MG tablet Take 2.5 mg by mouth daily.   ? bisacodyL (DULCOLAX) 10 mg suppository Insert 1 suppository (10 mg total) into the rectum daily as needed.   ? cyclobenzaprine (FLEXERIL) 5 MG tablet Take 1 tablet (5 mg total) by mouth 3  (three) times a day as needed for muscle spasms.   ? ferrous sulfate 325 (65 FE) MG tablet Take 1 tablet by mouth daily with breakfast.   ? melatonin 3 mg Tab tablet Take 1 tablet (3 mg total) by mouth at bedtime as needed.   ? metoprolol tartrate (LOPRESSOR) 25 MG tablet Take 25 mg by mouth 2 (two) times a day.   ? oxyCODONE (ROXICODONE) 5 MG immediate release tablet Take 1-2 tablets (5-10 mg total) by mouth every 4 (four) hours as needed (5mg for pain 5-7.  10mg for pain 8-10).   ? polyethylene glycol (MIRALAX) 17 gram packet Take 1 packet (17 g total) by mouth daily. Hold for 3 or more loose stools per day   ? rivaroxaban ANTICOAGULANT (XARELTO) 20 mg tablet Take 1 tablet (20 mg total) by mouth daily with supper.   ? senna-docusate (PERICOLACE) 8.6-50 mg tablet Take 2 tablets by mouth 2 (two) times a day. Hold for 3 or more loose stools per day       Physical Exam:   Note: COVID-19 pandemic precautions in place. Physical exam performed with social distancing considerations.  General: Patient is alert pleasant female, no distress.   Vitals: /82, Temp 98, Pulse 90, RR 18, O2 sat 96%RA.  HEENT: Head is NCAT. Eyes show no injection or icterus. Nares negative. Oropharynx well hydrated.  Neck: Supple. No tenderness or adenopathy. No JVD.  Lungs: Non labored respirations.  Abdomen: Soft, no tenderness. No guarding rebound or rigidity.  : Deferred.  Extremities: Mod right knee swelling.   Skin: Surgical incisions prox thigh, distal femur, healing well.  Psych: Mood appears good.      Labs:  Component      Latest Ref Rng & Units 9/8/2020 9/9/2020 9/10/2020 9/11/2020   WBC      4.0 - 11.0 thou/uL 17.6 (H) 10.2 8.2 11.7 (H)   RBC      3.80 - 5.40 mill/uL 5.09 4.18 3.38 (L) 3.24 (L)   Hemoglobin      12.0 - 16.0 g/dL 15.3 12.4 10.2 (L) 9.7 (L)   Hematocrit      35.0 - 47.0 % 45.8 37.4 31.6 (L) 30.0 (L)   MCV      80 - 100 fL 90 90 94 93   MCH      27.0 - 34.0 pg 30.1 29.7 30.2 29.9   MCHC      32.0 - 36.0 g/dL 33.4  33.2 32.3 32.3   RDW      11.0 - 14.5 % 13.6 13.8 14.0 14.0   Platelets      140 - 440 thou/uL 323 285 194 202   MPV      8.5 - 12.5 fL 10.5 10.3 10.4 10.9     Component      Latest Ref Rng & Units 9/12/2020 9/13/2020 9/14/2020 9/15/2020   WBC      4.0 - 11.0 thou/uL 11.3 (H) 11.6 (H) 13.0 (H) 13.8 (H)   RBC      3.80 - 5.40 mill/uL 2.63 (L) 2.65 (L) 2.65 (L) 2.55 (L)   Hemoglobin      12.0 - 16.0 g/dL 7.9 (L) 8.1 (L) 8.0 (L) 7.7 (L)   Hematocrit      35.0 - 47.0 % 24.2 (L) 24.1 (L) 24.4 (L) 23.7 (L)   MCV      80 - 100 fL 92 91 92 93   MCH      27.0 - 34.0 pg 30.0 30.6 30.2 30.2   MCHC      32.0 - 36.0 g/dL 32.6 33.6 32.8 32.5   RDW      11.0 - 14.5 % 14.0 14.2 14.6 (H) 15.0 (H)   Platelets      140 - 440 thou/uL 219 247 280 309   MPV      8.5 - 12.5 fL 11.0 10.7 10.3 10.3       Results for orders placed or performed during the hospital encounter of 09/08/20   Basic Metabolic Panel   Result Value Ref Range    Sodium 138 136 - 145 mmol/L    Potassium 4.0 3.5 - 5.0 mmol/L    Chloride 102 98 - 107 mmol/L    CO2 26 22 - 31 mmol/L    Anion Gap, Calculation 10 5 - 18 mmol/L    Glucose 110 70 - 125 mg/dL    Calcium 8.0 (L) 8.5 - 10.5 mg/dL    BUN 19 8 - 28 mg/dL    Creatinine 0.60 0.60 - 1.10 mg/dL    GFR MDRD Af Amer >60 >60 mL/min/1.73m2    GFR MDRD Non Af Amer >60 >60 mL/min/1.73m2     Lab Results   Component Value Date    HGB 8.1 (L) 09/17/2020     Lab Results   Component Value Date    WBC 12.2 (H) 09/17/2020    HGB 8.1 (L) 09/17/2020    HCT 25.3 (L) 09/17/2020    MCV 96 09/17/2020     (H) 09/17/2020       Assessment/Plan:  1. Right distal femur fracture. Sustained in a fall on 9/8/2020. S/p ORIF with intramedullary nail on 9/9/2020. TTWB. Ortho visit 9/29/2020, next 10/13/2020.    2. Bilateral post op acute PE. On xarelto x 6 months. No chest pain or hypoxia.   3. ABLA. Sec to surgery. Received transfusion on 9/13/2020. DC from hosp at 7.7. Recheck 9/17/20 was 8.1. Continue iron.  4. HTN. On metoprolol and  amlodipine. BPs monitored.   5. Tachycardia. EKG showed sinus tach. Improved since starting metoprolol for BP and HR. Will continue to titrate dose. Asymptomatic.            Electronically signed by: Kiara Mares MD

## 2021-06-20 NOTE — LETTER
Letter by Jess Presley CNP at      Author: Jess Presley CNP Service: -- Author Type: --    Filed:  Encounter Date: 10/2/2020 Status: (Other)         Patient: Jovana Clinton   MR Number: [id number]   YOB: 1945   Date of Visit: 10/2/2020     [facility]    Facility:   [facility] [[id number]]   Code Status: DNR      CHIEF COMPLAINT/REASON FOR VISIT:  Chief Complaint   Patient presents with   ? Problem Visit     F/U right hip sirgery, BP       HISTORY:      HPI: Jovana is a 75 y.o. female undergoing physical and occupational therapy at [facility]   She was hospitalized from 9/8-9/15/20. with no significant past medical history who presented after mechanical  fall at home. Right femur xray and CT right knee showed comminuted distal femoral shaft fracture. She underwent  ORIF right distal femur fracture using retrograde intramedullary nail. She also developed acute bilateral PE's and is on Xarelto.     Today she is seen to review right hip incision and BP's. Right hip incision is healing well with no S/S infection. Staples have been removed on 9/29. /95. She is on on metoprolol and Norvasc recently added. She  Denies fever, chills,Denies any chest pain,headaches,lightheadedness, dizziness,   shortness of breath, or cough. Appetite is good. Denies any GERD symptoms.   Denies any difficulty with swallowing,Denies any abdominal pain, constipation or loose stools. No insomnia. No active bleeding. She continues to be TTWB.     Past Medical History:   Diagnosis Date   ? Secondary hypertension 9/21/2020             Family History   Problem Relation Age of Onset   ? Heart disease Neg Hx      Social History     Socioeconomic History   ? Marital status:      Spouse name: Not on file   ? Number of children: Not on file   ? Years of education: Not on file   ? Highest education level: Not on file   Occupational History   ? Not on file    Social Needs   ? Financial resource strain: Not on file   ? Food insecurity     Worry: Not on file     Inability: Not on file   ? Transportation needs     Medical: Not on file     Non-medical: Not on file   Tobacco Use   ? Smoking status: Former Smoker   ? Smokeless tobacco: Never Used   Substance and Sexual Activity   ? Alcohol use: Yes     Alcohol/week: 1.0 standard drinks     Types: 1 Shots of liquor per week     Frequency: 2-4 times a month     Drinks per session: 1 or 2     Binge frequency: Never   ? Drug use: Never   ? Sexual activity: Not on file   Lifestyle   ? Physical activity     Days per week: Not on file     Minutes per session: Not on file   ? Stress: Not on file   Relationships   ? Social connections     Talks on phone: Not on file     Gets together: Not on file     Attends Shinto service: Not on file     Active member of club or organization: Not on file     Attends meetings of clubs or organizations: Not on file     Relationship status: Not on file   ? Intimate partner violence     Fear of current or ex partner: Not on file     Emotionally abused: Not on file     Physically abused: Not on file     Forced sexual activity: Not on file   Other Topics Concern   ? Incontinent Not Asked   ? Toileting independently Not Asked   ? Cognitive impairment Not Asked   ? Vision impairment Not Asked   ? Hearing impairment Not Asked   ? Dentures Not Asked   Social History Narrative   ? Not on file         Review of Systems   Constitutional: Negative for activity change, appetite change, fatigue and fever.   HENT: Negative for congestion.    Respiratory: Negative for cough, shortness of breath and wheezing.    Cardiovascular: Negative for chest pain and leg swelling.   Gastrointestinal: Negative for abdominal distention, abdominal pain, constipation, diarrhea and nausea.   Genitourinary: Negative for dysuria.   Musculoskeletal: Positive for arthralgias. Negative for back pain.   Skin: Positive for wound.  Negative for color change.   Neurological: Negative for dizziness.   Psychiatric/Behavioral: Negative for agitation, behavioral problems and confusion.       Vitals:    10/02/20 0909   BP: (!) 157/95   Pulse: 77   Resp: 18   Temp: 98.9  F (37.2  C)   SpO2: 94%   Weight: (!) 264 lb 1.6 oz (119.8 kg)       Physical Exam  Constitutional:       Appearance: She is well-developed.   HENT:      Head: Normocephalic.   Eyes:      Conjunctiva/sclera: Conjunctivae normal.   Neck:      Musculoskeletal: Normal range of motion.   Cardiovascular:      Rate and Rhythm: Normal rate and regular rhythm.      Heart sounds: Normal heart sounds. No murmur.   Pulmonary:      Effort: No respiratory distress.      Breath sounds: Normal breath sounds. No wheezing or rales.   Abdominal:      General: Bowel sounds are normal. There is no distension.      Palpations: Abdomen is soft.      Tenderness: There is no abdominal tenderness.   Musculoskeletal:      Right lower leg: Edema present.      Comments: Decreased RLE   Skin:     General: Skin is warm.      Comments: 3 hip incisions right lateral thigh. Staples have been removed.    Neurological:      Mental Status: She is alert and oriented to person, place, and time.   Psychiatric:         Behavior: Behavior normal.           LABS:   No results found for this or any previous visit (from the past 240 hour(s)).  Current Outpatient Medications   Medication Sig   ? acetaminophen (TYLENOL) 500 MG tablet Take 1,000 mg by mouth every 8 (eight) hours as needed for pain.   ? amLODIPine (NORVASC) 2.5 MG tablet Take 2.5 mg by mouth daily.   ? bisacodyL (DULCOLAX) 10 mg suppository Insert 1 suppository (10 mg total) into the rectum daily as needed.   ? cyclobenzaprine (FLEXERIL) 5 MG tablet Take 1 tablet (5 mg total) by mouth 3 (three) times a day as needed for muscle spasms.   ? ferrous sulfate 325 (65 FE) MG tablet Take 1 tablet by mouth daily with breakfast.   ? melatonin 3 mg Tab tablet Take 1 tablet  (3 mg total) by mouth at bedtime as needed.   ? metoprolol tartrate (LOPRESSOR) 25 MG tablet Take 25 mg by mouth 2 (two) times a day.   ? oxyCODONE (ROXICODONE) 5 MG immediate release tablet Take 1-2 tablets (5-10 mg total) by mouth every 4 (four) hours as needed (5mg for pain 5-7.  10mg for pain 8-10).   ? polyethylene glycol (MIRALAX) 17 gram packet Take 1 packet (17 g total) by mouth daily. Hold for 3 or more loose stools per day   ? rivaroxaban ANTICOAGULANT (XARELTO) 15 mg tablet Take 1 tablet (15 mg total) by mouth 2 (two) times a day with meals for 20 days.   ? [START ON 10/5/2020] rivaroxaban ANTICOAGULANT (XARELTO) 20 mg tablet Take 1 tablet (20 mg total) by mouth daily with supper.   ? senna-docusate (PERICOLACE) 8.6-50 mg tablet Take 2 tablets by mouth 2 (two) times a day. Hold for 3 or more loose stools per day     ASSESSMENT:      ICD-10-CM    1. Secondary hypertension  I15.9    2. Displaced comminuted fracture of shaft of right femur, initial encounter for closed fracture (H)  S72.351A    3. Pain management  R52        PLAN:    ORIF right femur PT OT, pain control,  follow-up with orthopedics as scheduled    Acute blood loss anemia hemoglobin 8.1 on 9/17/2020, on ferrous sulfate, monitor labs    Hypertension metoprolol tartrate to 25 mg twice daily recently started on Norvasc.     Anticoagulation on Xarelto    Insomnia continue melatonin    Pain management on extra strength Tylenol and oxycodone as needed    Electronically signed by: Jess Presley CNP

## 2021-06-20 NOTE — LETTER
Letter by Jess Presley CNP at      Author: Jess Presley CNP Service: -- Author Type: --    Filed:  Encounter Date: 10/12/2020 Status: (Other)         Patient: Jovana Clinton   MR Number: [id number]   YOB: 1945   Date of Visit: 10/12/2020     [facility]    Facility:   [facility] [[id number]]   Code Status: DNR      CHIEF COMPLAINT/REASON FOR VISIT:  Chief Complaint   Patient presents with   ? Problem Visit     F/U blood when wiping        HISTORY:      HPI: Jovana is a 75 y.o. female undergoing physical and occupational therapy at [facility]   She was hospitalized from 9/8-9/15/20. with no significant past medical history who presented after mechanical  fall at home. Right femur xray and CT right knee showed comminuted distal femoral shaft fracture. She underwent  ORIF right distal femur fracture using retrograde intramedullary nail. She also developed acute bilateral PE's and is on Xarelto.     Today she is seen to review blood pressure and reports of scant bleeding when she wipes.  Her blood pressures were reviewed and continue to be elevated.  She is currently on metoprolol and Norvasc 2.5 mg.  Her Norvasc increased to 5 mg today.  She has been having bleeding when she wipes  which is light pink and going on for approximately 2 days per her report.  It does not appear to be coming from the rectum.  She did wipe the front of her vagina this morning and there was blood noted on the tissue which was a light pink.  She denies hemorrhoids. Will  check a hemoglobin and UA along with  a GYN consult.  Patient reports she does not have a GYN doctor because she does not go to the doctors   She  Denies fever, chills,Denies any chest pain,headaches,lightheadedness, dizziness,   shortness of breath, or cough. Appetite is good. Denies any GERD symptoms.   Denies any difficulty with swallowing,Denies any abdominal pain, constipation or loose  stools. No insomnia. No active bleeding. She continues to be TTWB.  Her pain is controlled and she will follow-up with orthopedics on 10/13/2020.    Past Medical History:   Diagnosis Date   ? Secondary hypertension 9/21/2020             Family History   Problem Relation Age of Onset   ? Heart disease Neg Hx      Social History     Socioeconomic History   ? Marital status:      Spouse name: Not on file   ? Number of children: Not on file   ? Years of education: Not on file   ? Highest education level: Not on file   Occupational History   ? Not on file   Social Needs   ? Financial resource strain: Not on file   ? Food insecurity     Worry: Not on file     Inability: Not on file   ? Transportation needs     Medical: Not on file     Non-medical: Not on file   Tobacco Use   ? Smoking status: Former Smoker   ? Smokeless tobacco: Never Used   Substance and Sexual Activity   ? Alcohol use: Yes     Alcohol/week: 1.0 standard drinks     Types: 1 Shots of liquor per week     Frequency: 2-4 times a month     Drinks per session: 1 or 2     Binge frequency: Never   ? Drug use: Never   ? Sexual activity: Not on file   Lifestyle   ? Physical activity     Days per week: Not on file     Minutes per session: Not on file   ? Stress: Not on file   Relationships   ? Social connections     Talks on phone: Not on file     Gets together: Not on file     Attends Worship service: Not on file     Active member of club or organization: Not on file     Attends meetings of clubs or organizations: Not on file     Relationship status: Not on file   ? Intimate partner violence     Fear of current or ex partner: Not on file     Emotionally abused: Not on file     Physically abused: Not on file     Forced sexual activity: Not on file   Other Topics Concern   ? Incontinent Not Asked   ? Toileting independently Not Asked   ? Cognitive impairment Not Asked   ? Vision impairment Not Asked   ? Hearing impairment Not Asked   ? Dentures Not Asked    Social History Narrative   ? Not on file         Review of Systems   Constitutional: Negative for activity change, appetite change, fatigue and fever.   HENT: Negative for congestion.    Respiratory: Negative for cough, shortness of breath and wheezing.    Cardiovascular: Negative for chest pain and leg swelling.   Gastrointestinal: Negative for abdominal distention, abdominal pain, constipation, diarrhea and nausea.   Genitourinary: Negative for dysuria.   Musculoskeletal: Positive for arthralgias. Negative for back pain.   Skin: Positive for wound. Negative for color change.   Neurological: Negative for dizziness.   Psychiatric/Behavioral: Negative for agitation, behavioral problems and confusion.       Vitals:    10/12/20 0827   BP: 163/90   Pulse: 78   Resp: 18   Temp: 98.5  F (36.9  C)   SpO2: 95%   Weight: (!) 250 lb 6.4 oz (113.6 kg)       Physical Exam  Constitutional:       Appearance: She is well-developed.   HENT:      Head: Normocephalic.   Eyes:      Conjunctiva/sclera: Conjunctivae normal.   Neck:      Musculoskeletal: Normal range of motion.   Cardiovascular:      Rate and Rhythm: Normal rate and regular rhythm.      Heart sounds: Normal heart sounds. No murmur.   Pulmonary:      Effort: No respiratory distress.      Breath sounds: Normal breath sounds. No wheezing or rales.   Abdominal:      General: Bowel sounds are normal. There is no distension.      Palpations: Abdomen is soft.      Tenderness: There is no abdominal tenderness.   Genitourinary:     Comments: Vaginal bleeding noted  Musculoskeletal:      Right lower leg: Edema present.      Comments: Decreased RLE   Skin:     General: Skin is warm.      Comments: 3 hip incisions right lateral thigh. Staples have been removed.    Neurological:      Mental Status: She is alert and oriented to person, place, and time.   Psychiatric:         Behavior: Behavior normal.           LABS:   Recent Results (from the past 240 hour(s))   COVID-19 Virus PCR  MRF    Specimen: Respiratory   Result Value Ref Range    COVID-19 VIRUS SPECIMEN SOURCE Nasopharyngeal     2019-nCOV Not Detected      Current Outpatient Medications   Medication Sig   ? acetaminophen (TYLENOL) 500 MG tablet Take 1,000 mg by mouth every 8 (eight) hours as needed for pain.   ? amLODIPine (NORVASC) 2.5 MG tablet Take 2.5 mg by mouth daily.   ? bisacodyL (DULCOLAX) 10 mg suppository Insert 1 suppository (10 mg total) into the rectum daily as needed.   ? cyclobenzaprine (FLEXERIL) 5 MG tablet Take 1 tablet (5 mg total) by mouth 3 (three) times a day as needed for muscle spasms.   ? ferrous sulfate 325 (65 FE) MG tablet Take 1 tablet by mouth daily with breakfast.   ? melatonin 3 mg Tab tablet Take 1 tablet (3 mg total) by mouth at bedtime as needed.   ? metoprolol tartrate (LOPRESSOR) 25 MG tablet Take 25 mg by mouth 2 (two) times a day.   ? oxyCODONE (ROXICODONE) 5 MG immediate release tablet Take 1-2 tablets (5-10 mg total) by mouth every 4 (four) hours as needed (5mg for pain 5-7.  10mg for pain 8-10).   ? polyethylene glycol (MIRALAX) 17 gram packet Take 1 packet (17 g total) by mouth daily. Hold for 3 or more loose stools per day   ? rivaroxaban ANTICOAGULANT (XARELTO) 20 mg tablet Take 1 tablet (20 mg total) by mouth daily with supper.   ? senna-docusate (PERICOLACE) 8.6-50 mg tablet Take 2 tablets by mouth 2 (two) times a day. Hold for 3 or more loose stools per day     ASSESSMENT:      ICD-10-CM    1. Secondary hypertension  I15.9    2. Vaginal bleeding  N93.9        PLAN:      Vaginal bleeding check CBC, UA, and GYN consult    ORIF right femur PT OT, pain control,  follow-up with orthopedics as scheduled    Acute blood loss anemia hemoglobin 8.1 on 9/17/2020, on ferrous sulfate, monitor labs    Hypertension metoprolol tartrate to 25 mg twice daily, increase Norvasc to 5 mg daily    Anticoagulation on Xarelto    Insomnia continue melatonin    Pain management on extra strength Tylenol and  oxycodone as needed    Electronically signed by: Jess Presley, CNP

## 2021-06-20 NOTE — LETTER
Letter by Jess Presley CNP at      Author: Jess Presley CNP Service: -- Author Type: --    Filed:  Encounter Date: 9/21/2020 Status: (Other)         Patient: Jovana Clinton   MR Number: [id number]   YOB: 1945   Date of Visit: 9/21/2020     [facility]    Facility:   [facility] [[id number]]   Code Status: DNR      CHIEF COMPLAINT/REASON FOR VISIT:  Chief Complaint   Patient presents with   ? Review Of Multiple Medical Conditions     hyperrtension, F/U right femur fracture.        HISTORY:      HPI: Jovana is a 75 y.o. female undergoing physical and occupational therapy at [facility]   She was hospitalized from 9/8-9/15/20. with no significant past medical history who presented after mechanical  fall at home. Right femur xray and CT right knee showed comminuted distal femoral shaft fracture. She underwent  ORIF right distal femur fracture using retrograde intramedullary nail. She also developed acute bilateral PE's and is on Xarelto.     Today she is seen to review multiple medical issues and to establish care,  Denies fever, chills,Denies any chest pain,headaches,lightheadedness, dizziness,   shortness of breath, or cough. Appetite is good. Denies any GERD symptoms.   Denies any difficulty with swallowing,Denies any abdominal pain, constipation or loose stools. No insomnia. No active bleeding. BP continue to be elevated and her metoprolol was increased to 25 mg two times a day.    Past Medical History:   Diagnosis Date   ? Secondary hypertension 9/21/2020             Family History   Problem Relation Age of Onset   ? Heart disease Neg Hx      Social History     Socioeconomic History   ? Marital status:      Spouse name: Not on file   ? Number of children: Not on file   ? Years of education: Not on file   ? Highest education level: Not on file   Occupational History   ? Not on file   Social Needs   ? Financial resource strain:  Not on file   ? Food insecurity     Worry: Not on file     Inability: Not on file   ? Transportation needs     Medical: Not on file     Non-medical: Not on file   Tobacco Use   ? Smoking status: Former Smoker   ? Smokeless tobacco: Never Used   Substance and Sexual Activity   ? Alcohol use: Yes     Alcohol/week: 1.0 standard drinks     Types: 1 Shots of liquor per week     Frequency: 2-4 times a month     Drinks per session: 1 or 2     Binge frequency: Never   ? Drug use: Never   ? Sexual activity: Not on file   Lifestyle   ? Physical activity     Days per week: Not on file     Minutes per session: Not on file   ? Stress: Not on file   Relationships   ? Social connections     Talks on phone: Not on file     Gets together: Not on file     Attends Muslim service: Not on file     Active member of club or organization: Not on file     Attends meetings of clubs or organizations: Not on file     Relationship status: Not on file   ? Intimate partner violence     Fear of current or ex partner: Not on file     Emotionally abused: Not on file     Physically abused: Not on file     Forced sexual activity: Not on file   Other Topics Concern   ? Incontinent Not Asked   ? Toileting independently Not Asked   ? Cognitive impairment Not Asked   ? Vision impairment Not Asked   ? Hearing impairment Not Asked   ? Dentures Not Asked   Social History Narrative   ? Not on file         Review of Systems   Constitutional: Negative for activity change, appetite change, fatigue and fever.   HENT: Negative for congestion.    Respiratory: Negative for cough, shortness of breath and wheezing.    Cardiovascular: Negative for chest pain and leg swelling.   Gastrointestinal: Negative for abdominal distention, abdominal pain, constipation, diarrhea and nausea.   Genitourinary: Negative for dysuria.   Musculoskeletal: Positive for arthralgias. Negative for back pain.   Skin: Positive for wound. Negative for color change.   Neurological: Negative  for dizziness.   Psychiatric/Behavioral: Negative for agitation, behavioral problems and confusion.       Vitals:    09/21/20 1038   BP: (!) 178/100   Pulse: 92   Resp: 18   Temp: 98.4  F (36.9  C)   SpO2: 94%   Weight: (!) 261 lb 8 oz (118.6 kg)       Physical Exam  Constitutional:       Appearance: She is well-developed.   HENT:      Head: Normocephalic.   Eyes:      Conjunctiva/sclera: Conjunctivae normal.   Neck:      Musculoskeletal: Normal range of motion.   Cardiovascular:      Rate and Rhythm: Normal rate and regular rhythm.      Heart sounds: Normal heart sounds. No murmur.   Pulmonary:      Effort: No respiratory distress.      Breath sounds: Normal breath sounds. No wheezing or rales.   Abdominal:      General: Bowel sounds are normal. There is no distension.      Palpations: Abdomen is soft.      Tenderness: There is no abdominal tenderness.   Musculoskeletal:      Right lower leg: Edema present.      Comments: Decreased RLE   Skin:     General: Skin is warm.      Comments: 3 hip incisions right lateral thigh stapled C/D/I    Neurological:      Mental Status: She is alert and oriented to person, place, and time.   Psychiatric:         Behavior: Behavior normal.           LABS:   Recent Results (from the past 240 hour(s))   Crossmatch   Result Value Ref Range    Crossmatch Compatible     Unit Type O Neg     Unit Number [id number]     Status Transfused     Component Red Blood Cells     PRODUCT CODE K6973T77     Issue Date and Time [id number]     Blood Type 9500     CODING SYSTEM [id number]    Anti-Xa Heparin Level   Result Value Ref Range    Anti-Xa Heparin Assay 0.46 0.30 - 0.70 IU/mL   HM2(CBC w/o Differential)   Result Value Ref Range    WBC 11.6 (H) 4.0 - 11.0 thou/uL    RBC 2.65 (L) 3.80 - 5.40 mill/uL    Hemoglobin 8.1 (L) 12.0 - 16.0 g/dL    Hematocrit 24.1 (L) 35.0 - 47.0 %    MCV 91 80 - 100 fL    MCH 30.6 27.0 - 34.0 pg    MCHC 33.6 32.0 - 36.0 g/dL    RDW 14.2 11.0 - 14.5 %    Platelets 247  140 - 440 thou/uL    MPV 10.7 8.5 - 12.5 fL   Basic Metabolic Panel   Result Value Ref Range    Sodium 138 136 - 145 mmol/L    Potassium 4.1 3.5 - 5.0 mmol/L    Chloride 104 98 - 107 mmol/L    CO2 26 22 - 31 mmol/L    Anion Gap, Calculation 8 5 - 18 mmol/L    Glucose 108 70 - 125 mg/dL    Calcium 8.0 (L) 8.5 - 10.5 mg/dL    BUN 17 8 - 28 mg/dL    Creatinine 0.67 0.60 - 1.10 mg/dL    GFR MDRD Af Amer >60 >60 mL/min/1.73m2    GFR MDRD Non Af Amer >60 >60 mL/min/1.73m2   Anti-Xa Heparin Level   Result Value Ref Range    Anti-Xa Heparin Assay 0.51 0.30 - 0.70 IU/mL   HM2(CBC w/o Differential)   Result Value Ref Range    WBC 13.0 (H) 4.0 - 11.0 thou/uL    RBC 2.65 (L) 3.80 - 5.40 mill/uL    Hemoglobin 8.0 (L) 12.0 - 16.0 g/dL    Hematocrit 24.4 (L) 35.0 - 47.0 %    MCV 92 80 - 100 fL    MCH 30.2 27.0 - 34.0 pg    MCHC 32.8 32.0 - 36.0 g/dL    RDW 14.6 (H) 11.0 - 14.5 %    Platelets 280 140 - 440 thou/uL    MPV 10.3 8.5 - 12.5 fL   Basic Metabolic Panel   Result Value Ref Range    Sodium 138 136 - 145 mmol/L    Potassium 3.8 3.5 - 5.0 mmol/L    Chloride 103 98 - 107 mmol/L    CO2 26 22 - 31 mmol/L    Anion Gap, Calculation 9 5 - 18 mmol/L    Glucose 114 70 - 125 mg/dL    Calcium 8.1 (L) 8.5 - 10.5 mg/dL    BUN 20 8 - 28 mg/dL    Creatinine 0.67 0.60 - 1.10 mg/dL    GFR MDRD Af Amer >60 >60 mL/min/1.73m2    GFR MDRD Non Af Amer >60 >60 mL/min/1.73m2   Magnesium   Result Value Ref Range    Magnesium 2.2 1.8 - 2.6 mg/dL   Procalcitonin   Result Value Ref Range    Procalcitonin 0.34 0.00 - 0.49 ng/mL   COVID-19 Virus PCR MRF    Specimen: Respiratory   Result Value Ref Range    COVID-19 VIRUS SPECIMEN SOURCE Nasopharyngeal     2019-nCOV       Test received-See reflex to IDDL test SARS CoV2 (COVID-19) Virus RT-PCR   SARS-CoV-2 (COVID-19) RT-PCR-IDDL    Specimen: Respiratory   Result Value Ref Range    SARS-CoV-2 Virus Specimen Source Nasopharyngeal     SARS-CoV-2 PCR Result NEGATIVE     SARS-COV-2 PCR COMMENT       Testing was  performed using the [employer] Xpress SARS-CoV-2 Assay on the [employer]   Lactic Acid   Result Value Ref Range    Lactic Acid 0.7 0.7 - 2.0 mmol/L   HM2(CBC w/o Differential)   Result Value Ref Range    WBC 13.8 (H) 4.0 - 11.0 thou/uL    RBC 2.55 (L) 3.80 - 5.40 mill/uL    Hemoglobin 7.7 (L) 12.0 - 16.0 g/dL    Hematocrit 23.7 (L) 35.0 - 47.0 %    MCV 93 80 - 100 fL    MCH 30.2 27.0 - 34.0 pg    MCHC 32.5 32.0 - 36.0 g/dL    RDW 15.0 (H) 11.0 - 14.5 %    Platelets 309 140 - 440 thou/uL    MPV 10.3 8.5 - 12.5 fL   Basic Metabolic Panel   Result Value Ref Range    Sodium 138 136 - 145 mmol/L    Potassium 4.0 3.5 - 5.0 mmol/L    Chloride 102 98 - 107 mmol/L    CO2 26 22 - 31 mmol/L    Anion Gap, Calculation 10 5 - 18 mmol/L    Glucose 110 70 - 125 mg/dL    Calcium 8.0 (L) 8.5 - 10.5 mg/dL    BUN 19 8 - 28 mg/dL    Creatinine 0.60 0.60 - 1.10 mg/dL    GFR MDRD Af Amer >60 >60 mL/min/1.73m2    GFR MDRD Non Af Amer >60 >60 mL/min/1.73m2   Magnesium   Result Value Ref Range    Magnesium 2.1 1.8 - 2.6 mg/dL   HM2(CBC w/o Differential)   Result Value Ref Range    WBC 12.2 (H) 4.0 - 11.0 thou/uL    RBC 2.65 (L) 3.80 - 5.40 mill/uL    Hemoglobin 8.1 (L) 12.0 - 16.0 g/dL    Hematocrit 25.3 (L) 35.0 - 47.0 %    MCV 96 80 - 100 fL    MCH 30.6 27.0 - 34.0 pg    MCHC 32.0 32.0 - 36.0 g/dL    RDW 16.9 (H) 11.0 - 14.5 %    Platelets 461 (H) 140 - 440 thou/uL    MPV 10.6 8.5 - 12.5 fL     Current Outpatient Medications   Medication Sig   ? acetaminophen (TYLENOL) 500 MG tablet Take 2 tablets (1,000 mg total) by mouth 3 (three) times a day for 7 days. Then three times a day PRN pain   ? bisacodyL (DULCOLAX) 10 mg suppository Insert 1 suppository (10 mg total) into the rectum daily as needed.   ? cyclobenzaprine (FLEXERIL) 5 MG tablet Take 1 tablet (5 mg total) by mouth 3 (three) times a day as needed for muscle spasms.   ? ferrous sulfate 325 (65 FE) MG tablet Take 1 tablet by mouth daily with breakfast.   ? melatonin 3 mg Tab tablet Take 1  tablet (3 mg total) by mouth at bedtime as needed.   ? metoprolol tartrate (LOPRESSOR) 25 MG tablet Take 25 mg by mouth 2 (two) times a day.   ? oxyCODONE (ROXICODONE) 5 MG immediate release tablet Take 1-2 tablets (5-10 mg total) by mouth every 4 (four) hours as needed (5mg for pain 5-7.  10mg for pain 8-10).   ? polyethylene glycol (MIRALAX) 17 gram packet Take 1 packet (17 g total) by mouth daily. Hold for 3 or more loose stools per day   ? rivaroxaban ANTICOAGULANT (XARELTO) 15 mg tablet Take 1 tablet (15 mg total) by mouth 2 (two) times a day with meals for 20 days.   ? [START ON 10/5/2020] rivaroxaban ANTICOAGULANT (XARELTO) 20 mg tablet Take 1 tablet (20 mg total) by mouth daily with supper.   ? senna-docusate (PERICOLACE) 8.6-50 mg tablet Take 2 tablets by mouth 2 (two) times a day. Hold for 3 or more loose stools per day     ASSESSMENT:      ICD-10-CM    1. Closed fracture of distal end of right femur with routine healing, unspecified fracture morphology, subsequent encounter  S72.401D    2. Acute blood loss anemia  D62    3. Secondary hypertension  I15.9        PLAN:    ORIF right femur PT OT, pain control, at least 20-minute increments twice daily, follow-up with orthopedics as scheduled    Acute blood loss anemia hemoglobin 8.1 on 9/17/2020, on ferrous sulfate, monitor labs    Hypertension increase metoprolol tartrate to 25 mg twice daily anticoagulation on    Anticoagulation on Xarelto    Insomnia continue melatonin    Pain management on extra strength Tylenol and oxycodone as needed    Electronically signed by: Jess Presley CNP

## 2021-06-20 NOTE — LETTER
Letter by Kiara Mares MD at      Author: Kiara Mares MD Service: -- Author Type: --    Filed:  Encounter Date: 9/22/2020 Status: (Other)         Patient: Jovana Clinton   MR Number: [id number]   YOB: 1945   Date of Visit: 9/22/2020     [facility]    Facility:   [facility] [[id number]]   Code Status: FULL CODE       Chief Complaint   Patient presents with   ? Follow Up     TCU 9/22/2020. Right distal femur fracture. HTN.       TCU HPI:   Jovana is a 75 y.o. female who fell on 9/8/2020, tripped at home and presented to the hospital with a right distal femur fracture. Hospital info as partially excerpted below.     75 y.o. old female with no significant past medical history who presented after mechanical  fall at home. Right femur xray and CT right knee showed comminuted distal femoral shaft fracture 0n 9/8/2020.  Admitted for further management.     Acute bilateral PE: bilateral segmental/subsegmental, w/o RV strain, Hemodynamically stable, , troponins negative.  TTE EF 75%, nml rv size/function although not well visualized, no PHTN, no hemodynamically significant valve disease.  Precipitant likely DVT RLE in setting of right femur fracture.  -Hgb stable x24hrs with change from IV Heparin to Xarelto.  Would plan 6 months anticoagulation for provoked PE   -wean off O2 as able at TCU     Acute comminuted distal femoral shaft fracture:  POD#6 ORIF right distal femur fracture using retrograde intramedullary nail.  EBL 200ml intra-op.  Orthopedic surgery follow-up as advised.  PT/OT.       Acute postop right leg pain: controlled on current regimen.     Acute hypoxic respiratory failure:  Stable/imrpoving.  2/2 bilateral PE and hypervolemia as well as not using I.S. and likely some atelectasis. COVID 9/8 and 9/14 negative.   -prn O2 and wean as able  -aggressive bronchial hygiene, I.S.     ABLA:  Hgb 15.3 (9/8)->9.7 (9/11)->7.9 9/12.  8.1  on 9/13 post 1U PRBC.  Hgb 7.7 on discharge.  CBC at TCU as advised.  Overall stable over past 36hrs.  Hemodynamically stable.     Sinus tachycardia: improved/stable.  multifactorial and reactive from acute medical issues.      Uncomplicated UTI: UC pansensitive e. coli  -complete cefdinir as prescribed     Constipation  -miralax, senna, mobilize.      Leukocytosis: reactive. 13.8 on d/c.  procalcitonin negative. Afebrile.  Being treated for UTI on sensitive regimen, no PNA symptoms, negative COVID 9/8 and 9/14.  I presume it's related to known PE and most probable a RLE DVT which is already actively being treated.  No other s/s of infection elsewhere.  CBC at TCU as advised.  Aggressive I.S. for atelectasis.     Overall stabilized and discharged to TCU on 9/15/2020 for PT, OT, nursing cares, medical management and monitoring.       Today:  BPs have been elevated, had been noted in the hospital. Patient with no prior hx of HTN. Discussed today, she has not been to a doctor in many years, at least over 20. Suspect previously undiagnosed and thus untreated HTN due to no contact with medical system. She stated that she recalls when she had teeth pulled at the dentist in preparation for dentures, she was told her BP was high but she does not recall the number. Her pulses running 90s to 107. EKG done in TCU showed sinus tachycardia. She had EKGs and echo in hospital. She was started on metoprolol and yesterday dose increased. She will need further titration for BP control. Consider cardiology referral if persistent tachycardia. She has not had any symptoms. She denies dizziness. Appetite is pretty good. No diarrhea or constipation. No urinary sx. No fever or cough. No HAs, palpitations. She is tired but not short of breath. No hypoxia. Regarding her distal femur fracture, she is TTWB, pain under better control, less spasms. She is on scheduled tylenol and has prn oxycodone. She did have ABLA which required transfusion.  Hgb upon hospital DC on 9/15/2020 was 7.7 and up to 8.1 in TCU on 9/17/20, started on iron.       Past Medical History:  No significant medical history.   Denies HTN, DM, CA, lung disease.      Medications:  Current Outpatient Medications   Medication Sig   ? bisacodyL (DULCOLAX) 10 mg suppository Insert 1 suppository (10 mg total) into the rectum daily as needed.   ? cyclobenzaprine (FLEXERIL) 5 MG tablet Take 1 tablet (5 mg total) by mouth 3 (three) times a day as needed for muscle spasms.   ? ferrous sulfate 325 (65 FE) MG tablet Take 1 tablet by mouth daily with breakfast.   ? melatonin 3 mg Tab tablet Take 1 tablet (3 mg total) by mouth at bedtime as needed.   ? metoprolol tartrate (LOPRESSOR) 25 MG tablet Take 25 mg by mouth 2 (two) times a day.   ? oxyCODONE (ROXICODONE) 5 MG immediate release tablet Take 1-2 tablets (5-10 mg total) by mouth every 4 (four) hours as needed (5mg for pain 5-7.  10mg for pain 8-10).   ? polyethylene glycol (MIRALAX) 17 gram packet Take 1 packet (17 g total) by mouth daily. Hold for 3 or more loose stools per day   ? rivaroxaban ANTICOAGULANT (XARELTO) 15 mg tablet Take 1 tablet (15 mg total) by mouth 2 (two) times a day with meals for 20 days.   ? [START ON 10/5/2020] rivaroxaban ANTICOAGULANT (XARELTO) 20 mg tablet Take 1 tablet (20 mg total) by mouth daily with supper.   ? senna-docusate (PERICOLACE) 8.6-50 mg tablet Take 2 tablets by mouth 2 (two) times a day. Hold for 3 or more loose stools per day       Physical Exam:   Note: COVID-19 pandemic precautions in place. Physical exam performed with social distancing considerations.  General: Patient is alert pleasant female, no distress.   Vitals: /98, 150/74, Temp 98.3, Pulse 94, RR 18, O2 sat 98%RA.  HEENT: Head is NCAT. Eyes show no injection or icterus. Nares negative. Oropharynx well hydrated.  Neck: Supple. No tenderness or adenopathy. No JVD.  Lungs: Non labored respirations.  : Deferred.  Extremities: Mod right LE  swelling, improved.   Musculoskeletal: Swelling right LE, improved.   Skin: Surgical incisions prox thigh, distal femur with staples, ecchymoses.   Psych: Mood appears good.      Labs:  Component      Latest Ref Rng & Units 9/8/2020 9/9/2020 9/10/2020 9/11/2020   WBC      4.0 - 11.0 thou/uL 17.6 (H) 10.2 8.2 11.7 (H)   RBC      3.80 - 5.40 mill/uL 5.09 4.18 3.38 (L) 3.24 (L)   Hemoglobin      12.0 - 16.0 g/dL 15.3 12.4 10.2 (L) 9.7 (L)   Hematocrit      35.0 - 47.0 % 45.8 37.4 31.6 (L) 30.0 (L)   MCV      80 - 100 fL 90 90 94 93   MCH      27.0 - 34.0 pg 30.1 29.7 30.2 29.9   MCHC      32.0 - 36.0 g/dL 33.4 33.2 32.3 32.3   RDW      11.0 - 14.5 % 13.6 13.8 14.0 14.0   Platelets      140 - 440 thou/uL 323 285 194 202   MPV      8.5 - 12.5 fL 10.5 10.3 10.4 10.9     Component      Latest Ref Rng & Units 9/12/2020 9/13/2020 9/14/2020 9/15/2020   WBC      4.0 - 11.0 thou/uL 11.3 (H) 11.6 (H) 13.0 (H) 13.8 (H)   RBC      3.80 - 5.40 mill/uL 2.63 (L) 2.65 (L) 2.65 (L) 2.55 (L)   Hemoglobin      12.0 - 16.0 g/dL 7.9 (L) 8.1 (L) 8.0 (L) 7.7 (L)   Hematocrit      35.0 - 47.0 % 24.2 (L) 24.1 (L) 24.4 (L) 23.7 (L)   MCV      80 - 100 fL 92 91 92 93   MCH      27.0 - 34.0 pg 30.0 30.6 30.2 30.2   MCHC      32.0 - 36.0 g/dL 32.6 33.6 32.8 32.5   RDW      11.0 - 14.5 % 14.0 14.2 14.6 (H) 15.0 (H)   Platelets      140 - 440 thou/uL 219 247 280 309   MPV      8.5 - 12.5 fL 11.0 10.7 10.3 10.3       Results for orders placed or performed during the hospital encounter of 09/08/20   Basic Metabolic Panel   Result Value Ref Range    Sodium 138 136 - 145 mmol/L    Potassium 4.0 3.5 - 5.0 mmol/L    Chloride 102 98 - 107 mmol/L    CO2 26 22 - 31 mmol/L    Anion Gap, Calculation 10 5 - 18 mmol/L    Glucose 110 70 - 125 mg/dL    Calcium 8.0 (L) 8.5 - 10.5 mg/dL    BUN 19 8 - 28 mg/dL    Creatinine 0.60 0.60 - 1.10 mg/dL    GFR MDRD Af Amer >60 >60 mL/min/1.73m2    GFR MDRD Non Af Amer >60 >60 mL/min/1.73m2     Lab Results   Component Value  Date    HGB 8.1 (L) 09/17/2020     Lab Results   Component Value Date    WBC 12.2 (H) 09/17/2020    HGB 8.1 (L) 09/17/2020    HCT 25.3 (L) 09/17/2020    MCV 96 09/17/2020     (H) 09/17/2020         Assessment/Plan:  1. Right distal femur fracture. Sustained in a fall on 9/8/2020. S/p ORIF using intramedullary nail on 9/9/2020. TTWB. Follow up with ortho on 9/29/20.   2. Bilateral acute PE. Post op. No evidence right heart strain. On xarelto x 6 months.   3. ABLA. Sec to surgery. Received transfusion on 9/13/2020. DC from hosp at 7.7. Recheck 9/17/20 is 8.1. Started iron for 30 days.  4. Tachycardia. EKG with sinus tachy. She was noted to have this in the hospital with work up there. It has been persistent. Titrate metoprolol.   5. HTN. Metoprolol as noted. Monitor BPs in TCU.  6. Hypoxic resp failure. Multifactorial. Post op PEs, hypervolemia, atelectasis. No hypoxia in TCU, has not needed oxygen. Continue IS.   7. UTI. UC grew 10-50 K E coli. Completed course of Cefdinir. No urinary sx. No fever or abdominal pain.   8. Leukocytosis. Presented to ED with wbc over 17, last at 12.2 on 9/17/20. Likely reactive, multifactorial. Treated for UTI but no other sign infection.        Electronically signed by: Kiara Mares MD

## 2021-06-20 NOTE — LETTER
Letter by Kiara Mares MD at      Author: Kiara Mares MD Service: -- Author Type: --    Filed:  Encounter Date: 9/24/2020 Status: (Other)         Patient: Jovana Clinton   MR Number: [id number]   YOB: 1945   Date of Visit: 9/24/2020     [facility]    Facility:   [facility] [[id number]]   Code Status: FULL CODE       Chief Complaint   Patient presents with   ? Follow Up     TCU 9/24/2020. Right distal femur fracture.        TCU HPI:   Jovana is a 75 y.o. female who fell on 9/8/2020, tripped at home and presented to the hospital with a right distal femur fracture. Hospital info as partially excerpted below.     75 y.o. old female with no significant past medical history who presented after mechanical  fall at home. Right femur xray and CT right knee showed comminuted distal femoral shaft fracture 0n 9/8/2020.  Admitted for further management.     Acute bilateral PE: bilateral segmental/subsegmental, w/o RV strain, Hemodynamically stable, , troponins negative.  TTE EF 75%, nml rv size/function although not well visualized, no PHTN, no hemodynamically significant valve disease.  Precipitant likely DVT RLE in setting of right femur fracture.  -Hgb stable x24hrs with change from IV Heparin to Xarelto.  Would plan 6 months anticoagulation for provoked PE   -wean off O2 as able at TCU     Acute comminuted distal femoral shaft fracture:  POD#6 ORIF right distal femur fracture using retrograde intramedullary nail.  EBL 200ml intra-op.  Orthopedic surgery follow-up as advised.  PT/OT.       Acute postop right leg pain: controlled on current regimen.     Acute hypoxic respiratory failure:  Stable/imrpoving.  2/2 bilateral PE and hypervolemia as well as not using I.S. and likely some atelectasis. COVID 9/8 and 9/14 negative.   -prn O2 and wean as able  -aggressive bronchial hygiene, I.S.     ABLA:  Hgb 15.3 (9/8)->9.7 (9/11)->7.9 9/12.  8.1 on  9/13 post 1U PRBC.  Hgb 7.7 on discharge.  CBC at TCU as advised.  Overall stable over past 36hrs.  Hemodynamically stable.     Sinus tachycardia: improved/stable.  multifactorial and reactive from acute medical issues.      Uncomplicated UTI: UC pansensitive e. coli  -complete cefdinir as prescribed     Constipation  -miralax, senna, mobilize.      Leukocytosis: reactive. 13.8 on d/c.  procalcitonin negative. Afebrile.  Being treated for UTI on sensitive regimen, no PNA symptoms, negative COVID 9/8 and 9/14.  I presume it's related to known PE and most probable a RLE DVT which is already actively being treated.  No other s/s of infection elsewhere.  CBC at TCU as advised.  Aggressive I.S. for atelectasis.     Overall stabilized and discharged to TCU on 9/15/2020 for PT, OT, nursing cares, medical management and monitoring.       Today:  She is doing pretty well. Pain is improving. BPs treated now with metoprolol, also tachycardia, now in to low 100s, improved. No sx per her report. EKG with sinus tach. Had echo in hospital. No HAs, palpitations. She is tired but not short of breath. No hypoxia. Restricted to TTWB. She is on scheduled tylenol and has prn oxycodone. Post op had bilateral PEs per CT on 9//10/2020. On xarelto. She did have ABLA which required transfusion. Hgb upon hospital DC on 9/15/2020 was 7.7 and is 8.1 follow up in TCU. She is on iron. She denies dizziness. Appetite is pretty good. No diarrhea or constipation. No urinary sx. No fever or cough.She will see ortho next week on 9/29/2020 for follow up.      Past Medical History:  No significant medical history.   Denies HTN, DM, CA, lung disease.      Medications:  Current Outpatient Medications   Medication Sig   ? acetaminophen (TYLENOL) 500 MG tablet Take 1,000 mg by mouth every 8 (eight) hours as needed for pain.   ? amLODIPine (NORVASC) 2.5 MG tablet Take 2.5 mg by mouth daily.   ? bisacodyL (DULCOLAX) 10 mg suppository Insert 1 suppository (10  mg total) into the rectum daily as needed.   ? cyclobenzaprine (FLEXERIL) 5 MG tablet Take 1 tablet (5 mg total) by mouth 3 (three) times a day as needed for muscle spasms.   ? ferrous sulfate 325 (65 FE) MG tablet Take 1 tablet by mouth daily with breakfast.   ? melatonin 3 mg Tab tablet Take 1 tablet (3 mg total) by mouth at bedtime as needed.   ? metoprolol tartrate (LOPRESSOR) 25 MG tablet Take 25 mg by mouth 2 (two) times a day.   ? oxyCODONE (ROXICODONE) 5 MG immediate release tablet Take 1-2 tablets (5-10 mg total) by mouth every 4 (four) hours as needed (5mg for pain 5-7.  10mg for pain 8-10).   ? polyethylene glycol (MIRALAX) 17 gram packet Take 1 packet (17 g total) by mouth daily. Hold for 3 or more loose stools per day   ? rivaroxaban ANTICOAGULANT (XARELTO) 15 mg tablet Take 1 tablet (15 mg total) by mouth 2 (two) times a day with meals for 20 days.   ? [START ON 10/5/2020] rivaroxaban ANTICOAGULANT (XARELTO) 20 mg tablet Take 1 tablet (20 mg total) by mouth daily with supper.   ? senna-docusate (PERICOLACE) 8.6-50 mg tablet Take 2 tablets by mouth 2 (two) times a day. Hold for 3 or more loose stools per day       Physical Exam:   Note: COVID-19 pandemic precautions in place. Physical exam performed with social distancing considerations.  General: Patient is alert pleasant female, no distress.   Vitals: /70, Temp 97.5, Pulse 94, RR 18, O2 sat 95%RA.  HEENT: Head is NCAT. Eyes show no injection or icterus. Nares negative. Oropharynx well hydrated.  Neck: Supple. No tenderness or adenopathy. No JVD.  Lungs: Non labored respirations.  Abdomen: Soft, no tenderness. No guarding rebound or rigidity.  : Deferred.  Extremities: Mod right LE swelling.   Musculoskeletal: Swelling right LE.   Skin: Surgical incisions prox thigh, distal femur with staples, ecchymoses.   Psych: Mood appears good.      Labs:  Component      Latest Ref Rng & Units 9/8/2020 9/9/2020 9/10/2020 9/11/2020   WBC      4.0 - 11.0  thou/uL 17.6 (H) 10.2 8.2 11.7 (H)   RBC      3.80 - 5.40 mill/uL 5.09 4.18 3.38 (L) 3.24 (L)   Hemoglobin      12.0 - 16.0 g/dL 15.3 12.4 10.2 (L) 9.7 (L)   Hematocrit      35.0 - 47.0 % 45.8 37.4 31.6 (L) 30.0 (L)   MCV      80 - 100 fL 90 90 94 93   MCH      27.0 - 34.0 pg 30.1 29.7 30.2 29.9   MCHC      32.0 - 36.0 g/dL 33.4 33.2 32.3 32.3   RDW      11.0 - 14.5 % 13.6 13.8 14.0 14.0   Platelets      140 - 440 thou/uL 323 285 194 202   MPV      8.5 - 12.5 fL 10.5 10.3 10.4 10.9     Component      Latest Ref Rng & Units 9/12/2020 9/13/2020 9/14/2020 9/15/2020   WBC      4.0 - 11.0 thou/uL 11.3 (H) 11.6 (H) 13.0 (H) 13.8 (H)   RBC      3.80 - 5.40 mill/uL 2.63 (L) 2.65 (L) 2.65 (L) 2.55 (L)   Hemoglobin      12.0 - 16.0 g/dL 7.9 (L) 8.1 (L) 8.0 (L) 7.7 (L)   Hematocrit      35.0 - 47.0 % 24.2 (L) 24.1 (L) 24.4 (L) 23.7 (L)   MCV      80 - 100 fL 92 91 92 93   MCH      27.0 - 34.0 pg 30.0 30.6 30.2 30.2   MCHC      32.0 - 36.0 g/dL 32.6 33.6 32.8 32.5   RDW      11.0 - 14.5 % 14.0 14.2 14.6 (H) 15.0 (H)   Platelets      140 - 440 thou/uL 219 247 280 309   MPV      8.5 - 12.5 fL 11.0 10.7 10.3 10.3       Results for orders placed or performed during the hospital encounter of 09/08/20   Basic Metabolic Panel   Result Value Ref Range    Sodium 138 136 - 145 mmol/L    Potassium 4.0 3.5 - 5.0 mmol/L    Chloride 102 98 - 107 mmol/L    CO2 26 22 - 31 mmol/L    Anion Gap, Calculation 10 5 - 18 mmol/L    Glucose 110 70 - 125 mg/dL    Calcium 8.0 (L) 8.5 - 10.5 mg/dL    BUN 19 8 - 28 mg/dL    Creatinine 0.60 0.60 - 1.10 mg/dL    GFR MDRD Af Amer >60 >60 mL/min/1.73m2    GFR MDRD Non Af Amer >60 >60 mL/min/1.73m2     Lab Results   Component Value Date    HGB 8.1 (L) 09/17/2020     Lab Results   Component Value Date    WBC 12.2 (H) 09/17/2020    HGB 8.1 (L) 09/17/2020    HCT 25.3 (L) 09/17/2020    MCV 96 09/17/2020     (H) 09/17/2020         Assessment/Plan:  1. Right distal femur fracture. Sustained in a fall on  9/8/2020. S/p ORIF using retrograde intramedullary nail on 9/9/2020. TTWB. Therapy as able. Follow up with ortho next week.   2. Bilateral acute PE. Post op. No evidence right heart strain. On xarelto x 6 months.   3. ABLA. Sec to surgery. Received transfusion on 9/13/2020. DC from hosp at 7.7. Recheck 9/17/20 was 8.1. Continue iron.  4. Tachycardia. EKG shows sinus tach. Improved since starting metoprolol for BP and HR. Will continue to titrate dose.   5. HTN. As noted, on metoprolol. Continue to monitor BPs.   6. Hypoxic resp failure. Multifactorial. Post op PEs, hypervolemia, atelectasis. Has prn oxygen though on room air and sats ok for now. Encourage IS, monitor closely. Stable.   7. UTI. UC grew 10-50 K E coli. Will complete course of Cefdinir today. No current urinary sx. No fever or abdominal pain.   8. Leukocytosis. Presented to ED with wbc over 17, last at 12.2 today. Likely reactive, multifactorial. Treated for UTI but no other sign infection.            Electronically signed by: Kiara Mares MD

## 2021-06-20 NOTE — LETTER
Letter by Kiara Mares MD at      Author: Kiara Mares MD Service: -- Author Type: --    Filed:  Encounter Date: 9/15/2020 Status: (Other)         Patient: Jovana Clinton   MR Number: [id number]   YOB: 1945   Date of Visit: 9/15/2020     [facility]      Facility:    [facility] [[id number]]  Code Status: FULL CODE       Chief Complaint/Reason for Visit:  Chief Complaint   Patient presents with   ? H & P     TCU 9/15/2020. Admit note to TCU for right distal femur fracture, operative repair, post op PE.        HPI:   Jovana is a 75 y.o. female who fell on 9/8/2020, tripped at home and presented to the hospital with a right distal femur fracture. Hospital info as partially excerpted below.     75 y.o. old female with no significant past medical history who presented after mechanical  fall at home. Right femur xray and CT right knee showed comminuted distal femoral shaft fracture 0n 9/8/2020.  Admitted for further management.     Acute bilateral PE: bilateral segmental/subsegmental, w/o RV strain, Hemodynamically stable, , troponins negative.  TTE EF 75%, nml rv size/function although not well visualized, no PHTN, no hemodynamically significant valve disease.  Precipitant likely DVT RLE in setting of right femur fracture.  -Hgb stable x24hrs with change from IV Heparin to Xarelto.  Would plan 6 months anticoagulation for provoked PE   -wean off O2 as able at TCU     Acute comminuted distal femoral shaft fracture:  POD#6 ORIF right distal femur fracture using retrograde intramedullary nail.  EBL 200ml intra-op.  Orthopedic surgery follow-up as advised.  PT/OT.       Acute postop right leg pain: controlled on current regimen.     Acute hypoxic respiratory failure:  Stable/imrpoving.  2/2 bilateral PE and hypervolemia as well as not using I.S. and likely some atelectasis. COVID 9/8 and 9/14 negative.   -prn O2 and wean as able  -aggressive  bronchial hygiene, I.S.     ABLA:  Hgb 15.3 (9/8)->9.7 (9/11)->7.9 9/12.  8.1 on 9/13 post 1U PRBC.  Hgb 7.7 on discharge.  CBC at TCU as advised.  Overall stable over past 36hrs.  Hemodynamically stable.     Sinus tachycardia: improved/stable.  multifactorial and reactive from acute medical issues.      Uncomplicated UTI: UC pansensitive e. coli  -complete cefdinir as prescribed     Constipation  -miralax, senna, mobilize.      Leukocytosis: reactive. 13.8 on d/c.  procalcitonin negative. Afebrile.  Being treated for UTI on sensitive regimen, no PNA symptoms, negative COVID 9/8 and 9/14.  I presume it's related to known PE and most probable a RLE DVT which is already actively being treated.  No other s/s of infection elsewhere.  CBC at TCU as advised.  Aggressive I.S. for atelectasis.     Overall stabilized and discharged to TCU on 9/15/2020 for PT, OT, nursing cares, medical management and monitoring.       Today:  Admitted to TCU today, has bandage and ace wrap to RLE. Restricted to TTWB. Will participate in therapy as able. Has prn order for oxygen but has not needed, will be frequently assessed due to PEs and resp failure in the hospital. She denies shortness of breath at rest. No chest pain. Does feel tired and fatigued. Has spasms in legs for which she has taken flexeril and has been effective. Otherwise she is on scheduled tylenol and has prn oxycodone. Has been healthy prior, no hx of HTN, DM or any medical problems for which she is being treated. It is noted she had significantly elevated BPs when she presented to the ED, systolic over 200, contributors of acute pain and stress. Post op had bilateral PEs per CT on 9//10/2020. Sinus tachycardia also noted throughout hospital stay, noted to be improving and felt to be multifactorial due to medical events. She did have ABLA which required transfusion. Hgb today upon hospital DC is 7.7 and will be checked in 2 days. She denies dizziness. Appetite is pretty  good. No diarrhea or constipation. No urinary sx. She is on a course of cefdinir for UTI. No fever or cough, tested neg for COVID per admissions criteria, test done on 9/14/2020. No new vision or hearing problems. She is , lives with .         Past Medical History:  No significant medical history.   Denies HTN, DM, CA, lung disease.          Surgical History:  Past Surgical History:   Procedure Laterality Date   ? CYST REMOVAL      ear   ? ORIF FEMUR FRACTURE Right 9/9/2020    Procedure: OPEN REDUCTION INTERNAL FIXATION, FRACTURE, FEMUR;  Surgeon: Ad Blanca DO;  Location: [facility];  Service: Orthopedics       Family History:   Family History   Problem Relation Age of Onset   ? Heart disease Neg Hx        Social History:    Social History     Socioeconomic History   ? Marital status:      Spouse name: Not on file   ? Number of children: Not on file   ? Years of education: Not on file   ? Highest education level: Not on file   Occupational History   ? Not on file   Social Needs   ? Financial resource strain: Not on file   ? Food insecurity     Worry: Not on file     Inability: Not on file   ? Transportation needs     Medical: Not on file     Non-medical: Not on file   Tobacco Use   ? Smoking status: Former Smoker   ? Smokeless tobacco: Never Used   Substance and Sexual Activity   ? Alcohol use: Yes     Alcohol/week: 1.0 standard drinks     Types: 1 Shots of liquor per week     Frequency: 2-4 times a month     Drinks per session: 1 or 2     Binge frequency: Never   ? Drug use: Never   ? Sexual activity: Not on file   Lifestyle   ? Physical activity     Days per week: Not on file     Minutes per session: Not on file   ? Stress: Not on file   Relationships   ? Social connections     Talks on phone: Not on file     Gets together: Not on file     Attends Bahai service: Not on file     Active member of club or organization: Not on file     Attends meetings of clubs or  organizations: Not on file     Relationship status: Not on file   ? Intimate partner violence     Fear of current or ex partner: Not on file     Emotionally abused: Not on file     Physically abused: Not on file     Forced sexual activity: Not on file   Other Topics Concern   ? Incontinent Not Asked   ? Toileting independently Not Asked   ? Cognitive impairment Not Asked   ? Vision impairment Not Asked   ? Hearing impairment Not Asked   ? Dentures Not Asked   Social History Narrative   ? Not on file       Medications:  Current Outpatient Medications   Medication Sig   ? acetaminophen (TYLENOL) 500 MG tablet Take 2 tablets (1,000 mg total) by mouth 3 (three) times a day for 7 days. Then three times a day PRN pain   ? bisacodyL (DULCOLAX) 10 mg suppository Insert 1 suppository (10 mg total) into the rectum daily as needed.   ? cyclobenzaprine (FLEXERIL) 5 MG tablet Take 1 tablet (5 mg total) by mouth 3 (three) times a day as needed for muscle spasms.   ? melatonin 3 mg Tab tablet Take 1 tablet (3 mg total) by mouth at bedtime as needed.   ? oxyCODONE (ROXICODONE) 5 MG immediate release tablet Take 1-2 tablets (5-10 mg total) by mouth every 4 (four) hours as needed (5mg for pain 5-7.  10mg for pain 8-10).   ? polyethylene glycol (MIRALAX) 17 gram packet Take 1 packet (17 g total) by mouth daily. Hold for 3 or more loose stools per day   ? rivaroxaban ANTICOAGULANT (XARELTO) 15 mg tablet Take 1 tablet (15 mg total) by mouth 2 (two) times a day with meals for 20 days.   ? [START ON 10/5/2020] rivaroxaban ANTICOAGULANT (XARELTO) 20 mg tablet Take 1 tablet (20 mg total) by mouth daily with supper.   ? senna-docusate (PERICOLACE) 8.6-50 mg tablet Take 2 tablets by mouth 2 (two) times a day. Hold for 3 or more loose stools per day       Allergies:  No Known Allergies       Review of Systems:  Pertinent items are noted in HPI.      Physical Exam:   Note: COVID-19 pandemic precautions in place. Physical exam performed with  social distancing considerations.  General: Patient is alert pleasant female, no distress.   Vitals: /72, Temp 98.2, Pulse 92, RR 18, O2 sat 92%RA.  HEENT: Head is NCAT. Eyes show no injection or icterus. Nares negative. Oropharynx well hydrated.  Neck: Supple. No tenderness or adenopathy. No JVD.  Lungs: Non labored respirations.  Abdomen: Soft, no tenderness. No guarding rebound or rigidity.  : Deferred.  Extremities: Mod right LE swelling.   Musculoskeletal: Swelling right LE.   Skin: Surgical incisions prox thigh, distal femur with staples, ecchymoses.   Psych: Mood appears good.      Labs:  Component      Latest Ref Rng & Units 9/8/2020 9/9/2020 9/10/2020 9/11/2020   WBC      4.0 - 11.0 thou/uL 17.6 (H) 10.2 8.2 11.7 (H)   RBC      3.80 - 5.40 mill/uL 5.09 4.18 3.38 (L) 3.24 (L)   Hemoglobin      12.0 - 16.0 g/dL 15.3 12.4 10.2 (L) 9.7 (L)   Hematocrit      35.0 - 47.0 % 45.8 37.4 31.6 (L) 30.0 (L)   MCV      80 - 100 fL 90 90 94 93   MCH      27.0 - 34.0 pg 30.1 29.7 30.2 29.9   MCHC      32.0 - 36.0 g/dL 33.4 33.2 32.3 32.3   RDW      11.0 - 14.5 % 13.6 13.8 14.0 14.0   Platelets      140 - 440 thou/uL 323 285 194 202   MPV      8.5 - 12.5 fL 10.5 10.3 10.4 10.9     Component      Latest Ref Rng & Units 9/12/2020 9/13/2020 9/14/2020 9/15/2020   WBC      4.0 - 11.0 thou/uL 11.3 (H) 11.6 (H) 13.0 (H) 13.8 (H)   RBC      3.80 - 5.40 mill/uL 2.63 (L) 2.65 (L) 2.65 (L) 2.55 (L)   Hemoglobin      12.0 - 16.0 g/dL 7.9 (L) 8.1 (L) 8.0 (L) 7.7 (L)   Hematocrit      35.0 - 47.0 % 24.2 (L) 24.1 (L) 24.4 (L) 23.7 (L)   MCV      80 - 100 fL 92 91 92 93   MCH      27.0 - 34.0 pg 30.0 30.6 30.2 30.2   MCHC      32.0 - 36.0 g/dL 32.6 33.6 32.8 32.5   RDW      11.0 - 14.5 % 14.0 14.2 14.6 (H) 15.0 (H)   Platelets      140 - 440 thou/uL 219 247 280 309   MPV      8.5 - 12.5 fL 11.0 10.7 10.3 10.3       Results for orders placed or performed during the hospital encounter of 09/08/20   Basic Metabolic Panel   Result  Value Ref Range    Sodium 138 136 - 145 mmol/L    Potassium 4.0 3.5 - 5.0 mmol/L    Chloride 102 98 - 107 mmol/L    CO2 26 22 - 31 mmol/L    Anion Gap, Calculation 10 5 - 18 mmol/L    Glucose 110 70 - 125 mg/dL    Calcium 8.0 (L) 8.5 - 10.5 mg/dL    BUN 19 8 - 28 mg/dL    Creatinine 0.60 0.60 - 1.10 mg/dL    GFR MDRD Af Amer >60 >60 mL/min/1.73m2    GFR MDRD Non Af Amer >60 >60 mL/min/1.73m2       Assessment/Plan:  1. Right distal femur fracture. Sustained in a fall on 9/8/2020. S/p ORIF using retrograde intramedullary nail on 9/9/2020. TTWB. Therapy as able. Follow up with ortho.   2. Bilateral acute PE. Post op. No evidence right heart strain. On xarelto x 6 months.   3. ABLA. Sec to surgery. Received transfusion on 9/13/2020. DC from hosp hgb today at 7.7. Recheck in 2 days.   4. Pain management. She has prn flexeril effective for spasms, scheduled tylenol and prn oxycodone.   5. Hypoxic resp failure. Multifactorial. Post op PEs, hypervolemia, atelectasis. Has prn oxygen though on room air and sats ok for now. Encourage IS, monitor closely.  6. Sinus tachycardia. Noted in hospital, reported improving. Frequent monitoring in TCU, vitals q shift per protocol.   7. UTI. UC grew 10-50 K E coli. Will complete course of Cefdinir on 9//17/2020. No current urinary sx. No fever or abdominal pain.   8. Leukocytosis. Presented to ED with wbc over 17, last at 13.8 today. Likely reactive, multifactorial. Treated for UTI but no other sign infection.  9. Elevated BPs. Will be monitored further, not on BP meds, no prior hx HTN. When she was in ED, BPs 212/108, 190/105, contributors acute pain, stress. Just admitted to TCU today, /72.  10. Code status is full code.             Electronically signed by: Kiara Mares MD

## 2021-06-20 NOTE — LETTER
Letter by Kiara Mares MD at      Author: Kiara Mares MD Service: -- Author Type: --    Filed:  Encounter Date: 9/29/2020 Status: (Other)         Patient: Jovana Clinton   MR Number: [id number]   YOB: 1945   Date of Visit: 9/29/2020     [facility]    Facility:   [facility] [[id number]]   Code Status: FULL CODE       Chief Complaint   Patient presents with   ? Follow Up     TCU 9/29/2020        TCU HPI:   Jovana is a 75 y.o. female who fell on 9/8/2020, tripped at home and presented to the hospital with a right distal femur fracture. Hospital info as partially excerpted below.     75 y.o. old female with no significant past medical history who presented after mechanical  fall at home. Right femur xray and CT right knee showed comminuted distal femoral shaft fracture 0n 9/8/2020.  Admitted for further management.     Acute bilateral PE: bilateral segmental/subsegmental, w/o RV strain, Hemodynamically stable, , troponins negative.  TTE EF 75%, nml rv size/function although not well visualized, no PHTN, no hemodynamically significant valve disease.  Precipitant likely DVT RLE in setting of right femur fracture.  -Hgb stable x24hrs with change from IV Heparin to Xarelto.  Would plan 6 months anticoagulation for provoked PE   -wean off O2 as able at TCU     Acute comminuted distal femoral shaft fracture:  POD#6 ORIF right distal femur fracture using retrograde intramedullary nail.  EBL 200ml intra-op.  Orthopedic surgery follow-up as advised.  PT/OT.       Acute postop right leg pain: controlled on current regimen.     Acute hypoxic respiratory failure:  Stable/imrpoving.  2/2 bilateral PE and hypervolemia as well as not using I.S. and likely some atelectasis. COVID 9/8 and 9/14 negative.   -prn O2 and wean as able  -aggressive bronchial hygiene, I.S.     ABLA:  Hgb 15.3 (9/8)->9.7 (9/11)->7.9 9/12.  8.1 on 9/13 post 1U PRBC.  Hgb 7.7 on  discharge.  CBC at TCU as advised.  Overall stable over past 36hrs.  Hemodynamically stable.     Sinus tachycardia: improved/stable.  multifactorial and reactive from acute medical issues.      Uncomplicated UTI: UC pansensitive e. coli  -complete cefdinir as prescribed     Constipation  -miralax, senna, mobilize.      Leukocytosis: reactive. 13.8 on d/c.  procalcitonin negative. Afebrile.  Being treated for UTI on sensitive regimen, no PNA symptoms, negative COVID 9/8 and 9/14.  I presume it's related to known PE and most probable a RLE DVT which is already actively being treated.  No other s/s of infection elsewhere.  CBC at TCU as advised.  Aggressive I.S. for atelectasis.     Overall stabilized and discharged to TCU on 9/15/2020 for PT, OT, nursing cares, medical management and monitoring.       Today:  She saw ortho today, staples removed, continue TTWB, RTC 2 weeks. Pain under adequate control, less swelling. Metoprolol for HTN and elevated HRs increased to 50 two times a day, starting today. Continue to titrate dose for BP control. No HAs, palpitations. She is not short of breath. No hypoxia. On xarelto due to post op PE. She did have ABLA which required transfusion. Hgb upon hospital DC on 9/15/2020 was 7.7 and is 8.1 follow up in TCU. She is on iron. She denies dizziness. Appetite is pretty good. No diarrhea or constipation. No urinary sx. No fever or cough.      Past Medical History:  No significant medical history.   Denies HTN, DM, CA, lung disease.      Medications:  Current Outpatient Medications   Medication Sig   ? acetaminophen (TYLENOL) 500 MG tablet Take 1,000 mg by mouth every 8 (eight) hours as needed for pain.        ? bisacodyL (DULCOLAX) 10 mg suppository Insert 1 suppository (10 mg total) into the rectum daily as needed.   ? cyclobenzaprine (FLEXERIL) 5 MG tablet Take 1 tablet (5 mg total) by mouth 3 (three) times a day as needed for muscle spasms.   ? ferrous sulfate 325 (65 FE) MG tablet  Take 1 tablet by mouth daily with breakfast.   ? melatonin 3 mg Tab tablet Take 1 tablet (3 mg total) by mouth at bedtime as needed.   ? metoprolol tartrate (LOPRESSOR) 50 MG tablet Take 50 mg by mouth 2 (two) times a day.   ? oxyCODONE (ROXICODONE) 5 MG immediate release tablet Take 1-2 tablets (5-10 mg total) by mouth every 4 (four) hours as needed (5mg for pain 5-7.  10mg for pain 8-10).   ? polyethylene glycol (MIRALAX) 17 gram packet Take 1 packet (17 g total) by mouth daily. Hold for 3 or more loose stools per day   ? rivaroxaban ANTICOAGULANT (XARELTO) 15 mg tablet Take 1 tablet (15 mg total) by mouth 2 (two) times a day with meals for 20 days.   ? [START ON 10/5/2020] rivaroxaban ANTICOAGULANT (XARELTO) 20 mg tablet Take 1 tablet (20 mg total) by mouth daily with supper.   ? senna-docusate (PERICOLACE) 8.6-50 mg tablet Take 2 tablets by mouth 2 (two) times a day. Hold for 3 or more loose stools per day       Physical Exam:   Note: COVID-19 pandemic precautions in place. Physical exam performed with social distancing considerations.  General: Patient is alert pleasant female, no distress.   Vitals: /90, Temp 98.6, Pulse 102, RR 20, O2 sat 94%RA.  HEENT: Head is NCAT. Eyes show no injection or icterus. Nares negative. Oropharynx well hydrated.  Neck: Supple. No tenderness or adenopathy. No JVD.  Lungs: Non labored respirations.  Abdomen: Soft, no tenderness. No guarding rebound or rigidity.  : Deferred.  Extremities: Mild to mod right LE swelling, improved.   Musculoskeletal: Swelling right LE.   Skin: Surgical incisions prox thigh, distal femur.  Psych: Mood appears good.      Labs:  Component      Latest Ref Rng & Units 9/8/2020 9/9/2020 9/10/2020 9/11/2020   WBC      4.0 - 11.0 thou/uL 17.6 (H) 10.2 8.2 11.7 (H)   RBC      3.80 - 5.40 mill/uL 5.09 4.18 3.38 (L) 3.24 (L)   Hemoglobin      12.0 - 16.0 g/dL 15.3 12.4 10.2 (L) 9.7 (L)   Hematocrit      35.0 - 47.0 % 45.8 37.4 31.6 (L) 30.0 (L)   MCV       80 - 100 fL 90 90 94 93   MCH      27.0 - 34.0 pg 30.1 29.7 30.2 29.9   MCHC      32.0 - 36.0 g/dL 33.4 33.2 32.3 32.3   RDW      11.0 - 14.5 % 13.6 13.8 14.0 14.0   Platelets      140 - 440 thou/uL 323 285 194 202   MPV      8.5 - 12.5 fL 10.5 10.3 10.4 10.9     Component      Latest Ref Rng & Units 9/12/2020 9/13/2020 9/14/2020 9/15/2020   WBC      4.0 - 11.0 thou/uL 11.3 (H) 11.6 (H) 13.0 (H) 13.8 (H)   RBC      3.80 - 5.40 mill/uL 2.63 (L) 2.65 (L) 2.65 (L) 2.55 (L)   Hemoglobin      12.0 - 16.0 g/dL 7.9 (L) 8.1 (L) 8.0 (L) 7.7 (L)   Hematocrit      35.0 - 47.0 % 24.2 (L) 24.1 (L) 24.4 (L) 23.7 (L)   MCV      80 - 100 fL 92 91 92 93   MCH      27.0 - 34.0 pg 30.0 30.6 30.2 30.2   MCHC      32.0 - 36.0 g/dL 32.6 33.6 32.8 32.5   RDW      11.0 - 14.5 % 14.0 14.2 14.6 (H) 15.0 (H)   Platelets      140 - 440 thou/uL 219 247 280 309   MPV      8.5 - 12.5 fL 11.0 10.7 10.3 10.3       Results for orders placed or performed during the hospital encounter of 09/08/20   Basic Metabolic Panel   Result Value Ref Range    Sodium 138 136 - 145 mmol/L    Potassium 4.0 3.5 - 5.0 mmol/L    Chloride 102 98 - 107 mmol/L    CO2 26 22 - 31 mmol/L    Anion Gap, Calculation 10 5 - 18 mmol/L    Glucose 110 70 - 125 mg/dL    Calcium 8.0 (L) 8.5 - 10.5 mg/dL    BUN 19 8 - 28 mg/dL    Creatinine 0.60 0.60 - 1.10 mg/dL    GFR MDRD Af Amer >60 >60 mL/min/1.73m2    GFR MDRD Non Af Amer >60 >60 mL/min/1.73m2     Lab Results   Component Value Date    HGB 8.1 (L) 09/17/2020     Lab Results   Component Value Date    WBC 12.2 (H) 09/17/2020    HGB 8.1 (L) 09/17/2020    HCT 25.3 (L) 09/17/2020    MCV 96 09/17/2020     (H) 09/17/2020         Assessment/Plan:  1. Right distal femur fracture. Sustained in a fall on 9/8/2020. S/p ORIF using retrograde intramedullary nail on 9/9/2020. TTWB. Ortho visit today, staples removed, RTC 2 weeks.   2. Bilateral post op acute PE. On xarelto x 6 months.   3. ABLA. Sec to surgery. Received transfusion on  9/13/2020. DC from hosp at 7.7. Recheck 9/17/20 was 8.1. Continue iron.  4. HTN. On metoprolol, dose just increased.  5. Tachycardia. EKG shows sinus tach. Improved since starting metoprolol for BP and HR. Will continue to titrate dose. Asymptomatic.        Electronically signed by: Kiara Mares MD

## 2021-06-21 ENCOUNTER — RECORDS - HEALTHEAST (OUTPATIENT)
Dept: INTERNAL MEDICINE | Facility: CLINIC | Age: 76
End: 2021-06-21

## 2021-06-21 DIAGNOSIS — I10 ESSENTIAL HYPERTENSION: ICD-10-CM

## 2021-06-21 RX ORDER — AMLODIPINE BESYLATE 5 MG/1
5 TABLET ORAL DAILY
Qty: 0.1 TABLET | Refills: 0 | Status: SHIPPED | OUTPATIENT
Start: 2021-06-21

## 2021-06-21 NOTE — LETTER
Letter by Kiara Mares MD at      Author: Kiara Mares MD Service: -- Author Type: --    Filed:  Encounter Date: 11/10/2020 Status: (Other)         Patient: Jovana Clinton   MR Number: [id number]   YOB: 1945   Date of Visit: 11/10/2020     [facility]    Facility:   [facility] [[id number]]   Code Status: FULL CODE       Chief Complaint   Patient presents with   ? Follow Up     TCU 11/10/2020. Right femur fracture. HTN.        TCU HPI:   Jovana is a 75 y.o. female who fell on 9/8/2020, tripped at home and presented to the hospital with a right distal femur fracture. Hospital info as partially excerpted below.     75 y.o. old female with no significant past medical history who presented after mechanical  fall at home. Right femur xray and CT right knee showed comminuted distal femoral shaft fracture 0n 9/8/2020.  Admitted for further management.     Acute bilateral PE: bilateral segmental/subsegmental, w/o RV strain, Hemodynamically stable, , troponins negative.  TTE EF 75%, nml rv size/function although not well visualized, no PHTN, no hemodynamically significant valve disease.  Precipitant likely DVT RLE in setting of right femur fracture.  -Hgb stable x24hrs with change from IV Heparin to Xarelto.  Would plan 6 months anticoagulation for provoked PE   -wean off O2 as able at TCU     Acute comminuted distal femoral shaft fracture:  POD#6 ORIF right distal femur fracture using retrograde intramedullary nail.  EBL 200ml intra-op.  Orthopedic surgery follow-up as advised.  PT/OT.       Acute postop right leg pain: controlled on current regimen.     Acute hypoxic respiratory failure:  Stable/improving.  2/2 bilateral PE and hypervolemia as well as not using I.S. and likely some atelectasis. COVID 9/8 and 9/14 negative.   -prn O2 and wean as able  -aggressive bronchial hygiene, I.S.     ABLA:  Hgb 15.3 (9/8)->9.7 (9/11)->7.9 9/12.  8.1 on  "9/13 post 1U PRBC.  Hgb 7.7 on discharge.  CBC at TCU as advised.  Overall stable over past 36hrs.  Hemodynamically stable.     Sinus tachycardia: improved/stable.  multifactorial and reactive from acute medical issues.      Uncomplicated UTI: UC pansensitive e. coli  -complete cefdinir as prescribed     Constipation  -miralax, senna, mobilize.      Leukocytosis: reactive. 13.8 on d/c.  procalcitonin negative. Afebrile.  Being treated for UTI on sensitive regimen, no PNA symptoms, negative COVID 9/8 and 9/14.  I presume it's related to known PE and most probable a RLE DVT which is already actively being treated.  No other s/s of infection elsewhere.  CBC at TCU as advised.  Aggressive I.S. for atelectasis.     Overall stabilized and discharged to TCU on 9/15/2020 for PT, OT, nursing cares, medical management and monitoring.       Today:  She saw ortho 9/29/2020, staples removed, continue TTWB. Follow up on 10/15/2020: \"stable appearing right distal femur fracture. Incisions healing well.\" Orders to begin sequential weight bearing starting with 25% on 10/19/2020, then 50% on 10/26/2020 and 75% on 11/2/2020. RTC 4 weeks. She is now full weight bearing starting 11/9/2020 and will see ortho later this week. Some increased discomfort now that she has been weight bearing. No increased swelling. No redness. No clinical concerns, will continue in therapy. For HTN, currently on amlodipine 5 mg and metoprolol 75 two times a day. BPs continue to show slow improvement. She has been asymptomatic. She is on xarelto due to PE. No SOB or hypoxia, No chest pain or palpitations or dizziness. She did have ABLA which required transfusion in the hospital. Last check on 10/13/2020 was 11.1. She completed 30 days of iron supplementation. Appetite is good. No diarrhea or constipation. No urinary sx. No fever or cough.      Past Medical History:  No significant medical history.   Denies HTN, DM, CA, lung disease.      Medications:  Current " Outpatient Medications   Medication Sig   ? acetaminophen (TYLENOL) 500 MG tablet Take 1,000 mg by mouth every 8 (eight) hours as needed for pain.   ? amLODIPine (NORVASC) 2.5 MG tablet Take 5 mg by mouth daily.    ? bisacodyL (DULCOLAX) 10 mg suppository Insert 1 suppository (10 mg total) into the rectum daily as needed.   ? cyclobenzaprine (FLEXERIL) 5 MG tablet Take 1 tablet (5 mg total) by mouth 3 (three) times a day as needed for muscle spasms.   ? melatonin 3 mg Tab tablet Take 1 tablet (3 mg total) by mouth at bedtime as needed.   ? metoprolol tartrate (LOPRESSOR) 25 MG tablet Take 75 mg by mouth 2 (two) times a day.    ? oxyCODONE (ROXICODONE) 5 MG immediate release tablet Take 1-2 tablets (5-10 mg total) by mouth every 4 (four) hours as needed (5mg for pain 5-7.  10mg for pain 8-10).   ? polyethylene glycol (MIRALAX) 17 gram packet Take 1 packet (17 g total) by mouth daily. Hold for 3 or more loose stools per day   ? rivaroxaban ANTICOAGULANT (XARELTO) 20 mg tablet Take 1 tablet (20 mg total) by mouth daily with supper.   ? senna-docusate (PERICOLACE) 8.6-50 mg tablet Take 2 tablets by mouth 2 (two) times a day. Hold for 3 or more loose stools per day       Physical Exam:   Note: COVID-19 pandemic precautions in place. Physical exam performed with social distancing considerations.  General: Patient is alert pleasant female, no distress.   Vitals: /87, Temp 98, Pulse 72, RR 18, O2 sat 97%RA.  HEENT: Head is NCAT. Eyes show no injection or icterus. Nares negative. Oropharynx well hydrated.  Neck: No JVD.  Lungs: Non labored respirations.  : Deferred.  Extremities: Mild right knee swelling.   Skin: Surgical incisions healed.  Psych: Mood appears good.      Labs:  Component      Latest Ref Rng & Units 9/8/2020 9/9/2020 9/10/2020 9/11/2020   WBC      4.0 - 11.0 thou/uL 17.6 (H) 10.2 8.2 11.7 (H)   RBC      3.80 - 5.40 mill/uL 5.09 4.18 3.38 (L) 3.24 (L)   Hemoglobin      12.0 - 16.0 g/dL 15.3 12.4 10.2  (L) 9.7 (L)   Hematocrit      35.0 - 47.0 % 45.8 37.4 31.6 (L) 30.0 (L)   MCV      80 - 100 fL 90 90 94 93   MCH      27.0 - 34.0 pg 30.1 29.7 30.2 29.9   MCHC      32.0 - 36.0 g/dL 33.4 33.2 32.3 32.3   RDW      11.0 - 14.5 % 13.6 13.8 14.0 14.0   Platelets      140 - 440 thou/uL 323 285 194 202   MPV      8.5 - 12.5 fL 10.5 10.3 10.4 10.9     Component      Latest Ref Rng & Units 9/12/2020 9/13/2020 9/14/2020 9/15/2020   WBC      4.0 - 11.0 thou/uL 11.3 (H) 11.6 (H) 13.0 (H) 13.8 (H)   RBC      3.80 - 5.40 mill/uL 2.63 (L) 2.65 (L) 2.65 (L) 2.55 (L)   Hemoglobin      12.0 - 16.0 g/dL 7.9 (L) 8.1 (L) 8.0 (L) 7.7 (L)   Hematocrit      35.0 - 47.0 % 24.2 (L) 24.1 (L) 24.4 (L) 23.7 (L)   MCV      80 - 100 fL 92 91 92 93   MCH      27.0 - 34.0 pg 30.0 30.6 30.2 30.2   MCHC      32.0 - 36.0 g/dL 32.6 33.6 32.8 32.5   RDW      11.0 - 14.5 % 14.0 14.2 14.6 (H) 15.0 (H)   Platelets      140 - 440 thou/uL 219 247 280 309   MPV      8.5 - 12.5 fL 11.0 10.7 10.3 10.3       Results for orders placed or performed during the hospital encounter of 09/08/20   Basic Metabolic Panel   Result Value Ref Range    Sodium 138 136 - 145 mmol/L    Potassium 4.0 3.5 - 5.0 mmol/L    Chloride 102 98 - 107 mmol/L    CO2 26 22 - 31 mmol/L    Anion Gap, Calculation 10 5 - 18 mmol/L    Glucose 110 70 - 125 mg/dL    Calcium 8.0 (L) 8.5 - 10.5 mg/dL    BUN 19 8 - 28 mg/dL    Creatinine 0.60 0.60 - 1.10 mg/dL    GFR MDRD Af Amer >60 >60 mL/min/1.73m2    GFR MDRD Non Af Amer >60 >60 mL/min/1.73m2     Lab Results   Component Value Date    WBC 12.2 (H) 09/17/2020    HGB 8.1 (L) 09/17/2020    HCT 25.3 (L) 09/17/2020    MCV 96 09/17/2020     (H) 09/17/2020     Lab Results   Component Value Date    HGB 11.1 (L) 10/13/2020       Renal US 9/28/2020 (PPX):  No BETTYE.   No hydronephrosis or shadowing calculi.      Assessment/Plan:  1. Right distal femur fracture. Sustained in a fall on 9/8/2020. S/p ORIF with intramedullary nail on 9/9/2020. Ortho visits  9/29/2020, 10/15/2020, pending this week 11/12/2020. Started full WB 11/9/2020 after sequential increases.  2. Bilateral post op acute PE. On xarelto x 6 months. No chest pain or hypoxia.   3. ABLA. Sec to surgery. Received transfusion. Last hgb on 10/13/2020 was 11.1. Completed 30 days of iron.  4. HTN. On metoprolol and amlodipine. Continue to monitor BPs in TCU, titrate meds as needed for control. No further tachycardia.           Electronically signed by: Kiara Mares MD

## 2021-06-21 NOTE — LETTER
Letter by Kiara Mares MD at      Author: Kiara Mares MD Service: -- Author Type: --    Filed:  Encounter Date: 11/12/2020 Status: (Other)         Patient: Jovana Clinton   MR Number: [id number]   YOB: 1945   Date of Visit: 11/12/2020     [facility]    Facility:   [facility] [place] [[id number]]   Code Status: FULL CODE       Chief Complaint   Patient presents with   ? Follow Up     TCU 11/12/2020.       TCU HPI:   Jovana is a 75 y.o. female who fell on 9/8/2020, tripped at home and presented to the hospital with a right distal femur fracture. Hospital info as partially excerpted below.     75 y.o. old female with no significant past medical history who presented after mechanical  fall at home. Right femur xray and CT right knee showed comminuted distal femoral shaft fracture 0n 9/8/2020.  Admitted for further management.     Acute bilateral PE: bilateral segmental/subsegmental, w/o RV strain, Hemodynamically stable, , troponins negative.  TTE EF 75%, nml rv size/function although not well visualized, no PHTN, no hemodynamically significant valve disease.  Precipitant likely DVT RLE in setting of right femur fracture.  -Hgb stable x24hrs with change from IV Heparin to Xarelto.  Would plan 6 months anticoagulation for provoked PE   -wean off O2 as able at TCU     Acute comminuted distal femoral shaft fracture:  POD#6 ORIF right distal femur fracture using retrograde intramedullary nail.  EBL 200ml intra-op.  Orthopedic surgery follow-up as advised.  PT/OT.       Acute postop right leg pain: controlled on current regimen.     Acute hypoxic respiratory failure:  Stable/improving.  2/2 bilateral PE and hypervolemia as well as not using I.S. and likely some atelectasis. COVID 9/8 and 9/14 negative.   -prn O2 and wean as able  -aggressive bronchial hygiene, I.S.     ABLA:  Hgb 15.3 (9/8)->9.7 (9/11)->7.9 9/12.  8.1 on 9/13 post 1U PRBC.  Hgb 7.7  "on discharge.  CBC at TCU as advised.  Overall stable over past 36hrs.  Hemodynamically stable.     Sinus tachycardia: improved/stable.  multifactorial and reactive from acute medical issues.      Uncomplicated UTI: UC pansensitive e. coli  -complete cefdinir as prescribed     Constipation  -miralax, senna, mobilize.      Leukocytosis: reactive. 13.8 on d/c.  procalcitonin negative. Afebrile.  Being treated for UTI on sensitive regimen, no PNA symptoms, negative COVID 9/8 and 9/14.  I presume it's related to known PE and most probable a RLE DVT which is already actively being treated.  No other s/s of infection elsewhere.  CBC at TCU as advised.  Aggressive I.S. for atelectasis.     Overall stabilized and discharged to TCU on 9/15/2020 for PT, OT, nursing cares, medical management and monitoring.       Today:  She saw ortho 9/29/2020, staples removed, continue TTWB. Follow up on 10/15/2020: \"stable appearing right distal femur fracture. Incisions healing well.\" Orders to begin sequential weight bearing starting with 25% on 10/19/2020, then 50% on 10/26/2020 and 75% on 11/2/2020. RTC 4 weeks. She is now full weight bearing starting 11/9/2020 and saw ortho today. NNO, RTC 6-8 weeks. She was told some increased discomfort in right knee is likely related to DJD.    For HTN, on amlodipine 5 mg and metoprolol 75 two times a day. BPs continue to show slow improvement, today will increase metoprolol to 100 two times a day. Earlier in TCU stay had tachycardia, resolved. She has been asymptomatic. She is on xarelto due to PE. No SOB or hypoxia, No chest pain or palpitations or dizziness. She did have ABLA which required transfusion in the hospital. Last check on 10/13/2020 was 11.1. She completed 30 days of iron supplementation. Appetite is good. No diarrhea or constipation. No urinary sx. No fever or cough.    She had a few episodes of some bleeding noted, appeared vaginal. No abdominal pain. No gyn hx of concern. She was " referred to gyn and had consult on 10/29/2020 at which time a transvaginal US was done showing thickened endometrium and left ovarian cyst. Endometrial biopsy was taken. She reports she received a call with neg biopsy results. She states she has not had any further bleeding. She will see gyn again if further concerns.      Past Medical History:  No significant medical history.   Denies HTN, DM, CA, lung disease.      Medications:  Current Outpatient Medications   Medication Sig   ? acetaminophen (TYLENOL) 500 MG tablet Take 1,000 mg by mouth every 8 (eight) hours as needed for pain.   ? amLODIPine (NORVASC) 2.5 MG tablet Take 5 mg by mouth daily.    ? bisacodyL (DULCOLAX) 10 mg suppository Insert 1 suppository (10 mg total) into the rectum daily as needed.   ? cyclobenzaprine (FLEXERIL) 5 MG tablet Take 1 tablet (5 mg total) by mouth 3 (three) times a day as needed for muscle spasms.   ? melatonin 3 mg Tab tablet Take 1 tablet (3 mg total) by mouth at bedtime as needed.   ? metoprolol tartrate (LOPRESSOR) 25 MG tablet Take 75 mg by mouth 2 (two) times a day.    ? oxyCODONE (ROXICODONE) 5 MG immediate release tablet Take 1-2 tablets (5-10 mg total) by mouth every 4 (four) hours as needed (5mg for pain 5-7.  10mg for pain 8-10).   ? polyethylene glycol (MIRALAX) 17 gram packet Take 1 packet (17 g total) by mouth daily. Hold for 3 or more loose stools per day   ? rivaroxaban ANTICOAGULANT (XARELTO) 20 mg tablet Take 1 tablet (20 mg total) by mouth daily with supper.   ? senna-docusate (PERICOLACE) 8.6-50 mg tablet Take 2 tablets by mouth 2 (two) times a day. Hold for 3 or more loose stools per day       Physical Exam:   Note: COVID-19 pandemic precautions in place. Physical exam performed with social distancing considerations.  General: Patient is alert pleasant female, no distress.   Vitals: /96, Temp 97.8, Pulse 65, RR 18, O2 sat 95%RA.  HEENT: Head is NCAT. Eyes show no injection or icterus. Nares negative.  Oropharynx well hydrated.  Neck: No JVD.  Lungs: Non labored respirations.  : Deferred.  Extremities: Mild right knee swelling.   Skin: Surgical incisions healed.  Psych: Mood appears good.      Labs:  Component      Latest Ref Rng & Units 9/8/2020 9/9/2020 9/10/2020 9/11/2020   WBC      4.0 - 11.0 thou/uL 17.6 (H) 10.2 8.2 11.7 (H)   RBC      3.80 - 5.40 mill/uL 5.09 4.18 3.38 (L) 3.24 (L)   Hemoglobin      12.0 - 16.0 g/dL 15.3 12.4 10.2 (L) 9.7 (L)   Hematocrit      35.0 - 47.0 % 45.8 37.4 31.6 (L) 30.0 (L)   MCV      80 - 100 fL 90 90 94 93   MCH      27.0 - 34.0 pg 30.1 29.7 30.2 29.9   MCHC      32.0 - 36.0 g/dL 33.4 33.2 32.3 32.3   RDW      11.0 - 14.5 % 13.6 13.8 14.0 14.0   Platelets      140 - 440 thou/uL 323 285 194 202   MPV      8.5 - 12.5 fL 10.5 10.3 10.4 10.9     Component      Latest Ref Rng & Units 9/12/2020 9/13/2020 9/14/2020 9/15/2020   WBC      4.0 - 11.0 thou/uL 11.3 (H) 11.6 (H) 13.0 (H) 13.8 (H)   RBC      3.80 - 5.40 mill/uL 2.63 (L) 2.65 (L) 2.65 (L) 2.55 (L)   Hemoglobin      12.0 - 16.0 g/dL 7.9 (L) 8.1 (L) 8.0 (L) 7.7 (L)   Hematocrit      35.0 - 47.0 % 24.2 (L) 24.1 (L) 24.4 (L) 23.7 (L)   MCV      80 - 100 fL 92 91 92 93   MCH      27.0 - 34.0 pg 30.0 30.6 30.2 30.2   MCHC      32.0 - 36.0 g/dL 32.6 33.6 32.8 32.5   RDW      11.0 - 14.5 % 14.0 14.2 14.6 (H) 15.0 (H)   Platelets      140 - 440 thou/uL 219 247 280 309   MPV      8.5 - 12.5 fL 11.0 10.7 10.3 10.3       Results for orders placed or performed during the hospital encounter of 09/08/20   Basic Metabolic Panel   Result Value Ref Range    Sodium 138 136 - 145 mmol/L    Potassium 4.0 3.5 - 5.0 mmol/L    Chloride 102 98 - 107 mmol/L    CO2 26 22 - 31 mmol/L    Anion Gap, Calculation 10 5 - 18 mmol/L    Glucose 110 70 - 125 mg/dL    Calcium 8.0 (L) 8.5 - 10.5 mg/dL    BUN 19 8 - 28 mg/dL    Creatinine 0.60 0.60 - 1.10 mg/dL    GFR MDRD Af Amer >60 >60 mL/min/1.73m2    GFR MDRD Non Af Amer >60 >60 mL/min/1.73m2     Lab Results    Component Value Date    WBC 12.2 (H) 09/17/2020    HGB 8.1 (L) 09/17/2020    HCT 25.3 (L) 09/17/2020    MCV 96 09/17/2020     (H) 09/17/2020     Lab Results   Component Value Date    HGB 11.1 (L) 10/13/2020       Renal US 9/28/2020 (PPX):  No BETTYE.   No hydronephrosis or shadowing calculi.      Assessment/Plan:  1. Right distal femur fracture. Sustained in a fall on 9/8/2020. S/p ORIF with intramedullary nail on 9/9/2020. Ortho visits 9/29/2020, 10/15/2020, and today 11/12/2020. She has been full WB. NNO today. RTC 6-8 weeks.   2. Bilateral post op acute PE. On xarelto x 6 months. No chest pain or hypoxia.   3. ABLA. Sec to surgery. Received transfusion. Last hgb on 10/13/2020 was 11.1. Completed 30 days of iron.  4. HTN. On metoprolol and amlodipine. Continue to monitor BPs in TCU, titrate meds as needed for control. No further tachycardia.    5. Postmenopausal bleeding. Saw gynecology on 10/29/2020 with biopsy she reports was told is neg. No further bleeding. F/u with gyn if recurs.           Electronically signed by: Kiara Mares MD

## 2021-06-21 NOTE — LETTER
Letter by Kiara Mares MD at      Author: Kiara Mares MD Service: -- Author Type: --    Filed:  Encounter Date: 10/15/2020 Status: (Other)         Patient: Jovana Clinton   MR Number: [id number]   YOB: 1945   Date of Visit: 10/15/2020     [facility]    Facility:   [facility] [[id number]]   Code Status: FULL CODE       Chief Complaint   Patient presents with   ? Follow Up     TCU 10/15/2020. Right femur fracture. HTN.        TCU HPI:   Jovana is a 75 y.o. female who fell on 9/8/2020, tripped at home and presented to the hospital with a right distal femur fracture. Hospital info as partially excerpted below.     75 y.o. old female with no significant past medical history who presented after mechanical  fall at home. Right femur xray and CT right knee showed comminuted distal femoral shaft fracture 0n 9/8/2020.  Admitted for further management.     Acute bilateral PE: bilateral segmental/subsegmental, w/o RV strain, Hemodynamically stable, , troponins negative.  TTE EF 75%, nml rv size/function although not well visualized, no PHTN, no hemodynamically significant valve disease.  Precipitant likely DVT RLE in setting of right femur fracture.  -Hgb stable x24hrs with change from IV Heparin to Xarelto.  Would plan 6 months anticoagulation for provoked PE   -wean off O2 as able at TCU     Acute comminuted distal femoral shaft fracture:  POD#6 ORIF right distal femur fracture using retrograde intramedullary nail.  EBL 200ml intra-op.  Orthopedic surgery follow-up as advised.  PT/OT.       Acute postop right leg pain: controlled on current regimen.     Acute hypoxic respiratory failure:  Stable/improving.  2/2 bilateral PE and hypervolemia as well as not using I.S. and likely some atelectasis. COVID 9/8 and 9/14 negative.   -prn O2 and wean as able  -aggressive bronchial hygiene, I.S.     ABLA:  Hgb 15.3 (9/8)->9.7 (9/11)->7.9 9/12.  8.1 on  "9/13 post 1U PRBC.  Hgb 7.7 on discharge.  CBC at TCU as advised.  Overall stable over past 36hrs.  Hemodynamically stable.     Sinus tachycardia: improved/stable.  multifactorial and reactive from acute medical issues.      Uncomplicated UTI: UC pansensitive e. coli  -complete cefdinir as prescribed     Constipation  -miralax, senna, mobilize.      Leukocytosis: reactive. 13.8 on d/c.  procalcitonin negative. Afebrile.  Being treated for UTI on sensitive regimen, no PNA symptoms, negative COVID 9/8 and 9/14.  I presume it's related to known PE and most probable a RLE DVT which is already actively being treated.  No other s/s of infection elsewhere.  CBC at TCU as advised.  Aggressive I.S. for atelectasis.     Overall stabilized and discharged to TCU on 9/15/2020 for PT, OT, nursing cares, medical management and monitoring.       Today:  She saw ortho 9/29/2020, staples removed, continue TTWB, RTC 10/13/2020. There was an apparent mix up with the appt and it was not 10/13/2020 but was today 10/15/2020. The referral form came back indicating \"stable appearing right distal femur fracture. Incisions healing well.\" Orders to begin sequential weight bearing starting with 25% on 10/19/2020, then 50% on 10/26/2020 and 75% on 11/2/2020. RTC 4 weeks. She denies significant pain. Swelling is down. Working on ROM. BP control is better, currently on amlodipine 5 mg and metoprolol 50 two times a day. May need further adjustments however. She had Renal US 9/28/2020 showing no evidence of BETTYE induced HTN. She has been asymptomatic, tolerating meds. She is on xarelto due to PE. No SOB or hypoxia, No chest pain or palpitations. She did have ABLA which required transfusion in the hospital. Hgb upon hospital DC on 9/15/2020 was 7.7 and was 8.1 on follow up in TCU 9/17/2020. Recheck on 10/13/2020 was 11.1. She is on iron. She denies dizziness. Appetite is good. No diarrhea or constipation. No urinary sx. No fever or cough.      Past " Medical History:  No significant medical history.   Denies HTN, DM, CA, lung disease.      Medications:  Current Outpatient Medications   Medication Sig   ? acetaminophen (TYLENOL) 500 MG tablet Take 1,000 mg by mouth every 8 (eight) hours as needed for pain.   ? amLODIPine (NORVASC) 2.5 MG tablet Take 5 mg by mouth daily.    ? bisacodyL (DULCOLAX) 10 mg suppository Insert 1 suppository (10 mg total) into the rectum daily as needed.   ? cyclobenzaprine (FLEXERIL) 5 MG tablet Take 1 tablet (5 mg total) by mouth 3 (three) times a day as needed for muscle spasms.   ? melatonin 3 mg Tab tablet Take 1 tablet (3 mg total) by mouth at bedtime as needed.   ? metoprolol tartrate (LOPRESSOR) 25 MG tablet Take 50 mg by mouth 2 (two) times a day.    ? oxyCODONE (ROXICODONE) 5 MG immediate release tablet Take 1-2 tablets (5-10 mg total) by mouth every 4 (four) hours as needed (5mg for pain 5-7.  10mg for pain 8-10).   ? polyethylene glycol (MIRALAX) 17 gram packet Take 1 packet (17 g total) by mouth daily. Hold for 3 or more loose stools per day   ? rivaroxaban ANTICOAGULANT (XARELTO) 20 mg tablet Take 1 tablet (20 mg total) by mouth daily with supper.   ? senna-docusate (PERICOLACE) 8.6-50 mg tablet Take 2 tablets by mouth 2 (two) times a day. Hold for 3 or more loose stools per day       Physical Exam:   Note: COVID-19 pandemic precautions in place. Physical exam performed with social distancing considerations.  General: Patient is alert pleasant female, no distress.   Vitals: /74, Temp 98.4, Pulse 85, RR 20, O2 sat 95%RA.  HEENT: Head is NCAT. Eyes show no injection or icterus. Nares negative. Oropharynx well hydrated.  Neck: Supple. No tenderness or adenopathy. No JVD.  Lungs: Non labored respirations.  Abdomen: Soft, no tenderness. No guarding rebound or rigidity.  : Deferred.  Extremities: Mild right knee swelling.   Skin: Surgical incisions healed.  Psych: Mood appears good.      Labs:  Component      Latest Ref  Rng & Units 9/8/2020 9/9/2020 9/10/2020 9/11/2020   WBC      4.0 - 11.0 thou/uL 17.6 (H) 10.2 8.2 11.7 (H)   RBC      3.80 - 5.40 mill/uL 5.09 4.18 3.38 (L) 3.24 (L)   Hemoglobin      12.0 - 16.0 g/dL 15.3 12.4 10.2 (L) 9.7 (L)   Hematocrit      35.0 - 47.0 % 45.8 37.4 31.6 (L) 30.0 (L)   MCV      80 - 100 fL 90 90 94 93   MCH      27.0 - 34.0 pg 30.1 29.7 30.2 29.9   MCHC      32.0 - 36.0 g/dL 33.4 33.2 32.3 32.3   RDW      11.0 - 14.5 % 13.6 13.8 14.0 14.0   Platelets      140 - 440 thou/uL 323 285 194 202   MPV      8.5 - 12.5 fL 10.5 10.3 10.4 10.9     Component      Latest Ref Rng & Units 9/12/2020 9/13/2020 9/14/2020 9/15/2020   WBC      4.0 - 11.0 thou/uL 11.3 (H) 11.6 (H) 13.0 (H) 13.8 (H)   RBC      3.80 - 5.40 mill/uL 2.63 (L) 2.65 (L) 2.65 (L) 2.55 (L)   Hemoglobin      12.0 - 16.0 g/dL 7.9 (L) 8.1 (L) 8.0 (L) 7.7 (L)   Hematocrit      35.0 - 47.0 % 24.2 (L) 24.1 (L) 24.4 (L) 23.7 (L)   MCV      80 - 100 fL 92 91 92 93   MCH      27.0 - 34.0 pg 30.0 30.6 30.2 30.2   MCHC      32.0 - 36.0 g/dL 32.6 33.6 32.8 32.5   RDW      11.0 - 14.5 % 14.0 14.2 14.6 (H) 15.0 (H)   Platelets      140 - 440 thou/uL 219 247 280 309   MPV      8.5 - 12.5 fL 11.0 10.7 10.3 10.3       Results for orders placed or performed during the hospital encounter of 09/08/20   Basic Metabolic Panel   Result Value Ref Range    Sodium 138 136 - 145 mmol/L    Potassium 4.0 3.5 - 5.0 mmol/L    Chloride 102 98 - 107 mmol/L    CO2 26 22 - 31 mmol/L    Anion Gap, Calculation 10 5 - 18 mmol/L    Glucose 110 70 - 125 mg/dL    Calcium 8.0 (L) 8.5 - 10.5 mg/dL    BUN 19 8 - 28 mg/dL    Creatinine 0.60 0.60 - 1.10 mg/dL    GFR MDRD Af Amer >60 >60 mL/min/1.73m2    GFR MDRD Non Af Amer >60 >60 mL/min/1.73m2     Lab Results   Component Value Date    HGB 8.1 (L) 09/17/2020     Lab Results   Component Value Date    WBC 12.2 (H) 09/17/2020    HGB 8.1 (L) 09/17/2020    HCT 25.3 (L) 09/17/2020    MCV 96 09/17/2020     (H) 09/17/2020     Lab Results    Component Value Date    HGB 11.1 (L) 10/13/2020       Renal US 9/28/2020 (PPX):  No BETTYE.   No hydronephrosis or shadowing calculi.      Assessment/Plan:  1. Right distal femur fracture. Sustained in a fall on 9/8/2020. S/p ORIF with intramedullary nail on 9/9/2020. TTWB. Ortho visits 9/29/2020, 10/15/2020. Now allowed to start WB beginning on 10/19/2020 with 25% and progress from there. RTC ortho 4 weeks.   2. Bilateral post op acute PE. On xarelto x 6 months. No chest pain or hypoxia.   3. ABLA. Sec to surgery. Received transfusion on 9/13/2020. DC from hosp at 7.7. Recheck 9/17/20 was 8.1, last hgb on 10/13/2020 was 11.1. Continue iron.  4. HTN. On metoprolol and amlodipine. BPs monitored, continue to titrate meds as needed.    5. Tachycardia. EKG showed sinus tach. Improved since starting metoprolol for BP and HR. Asymptomatic.          Electronically signed by: Kiara Mares MD

## 2021-06-21 NOTE — LETTER
Letter by Jess Presley CNP at      Author: Jess Presley CNP Service: -- Author Type: --    Filed:  Encounter Date: 11/18/2020 Status: (Other)         Patient: Jovana Clinton   MR Number: [id number]   YOB: 1945   Date of Visit: 11/18/2020     [facility]    Facility:   [facility] [[id number]]   Code Status: FULL CODE  PCP: Provider, [facility]   Phone: [phone]   Fax: None      CHIEF COMPLAINT/REASON FOR VISIT:  Chief Complaint   Patient presents with   ? Discharge Summary       HISTORY COURSE:  Jovana is a 75 y.o. female undergoing physical and occupational therapy at [facility]. She is with past medical history of  Hypertension.  who presented after mechanical  fall at home. Right femur xray and CT right knee showed comminuted distal femoral shaft fracture. She underwent  ORIF right distal femur fracture using retrograde intramedullary nail. She also developed acute bilateral PE's and is on Xarelto.      She was hospitalized from 9/8-9/15/20.      Today she is seen for a face-to-face for discharge.  She will discharge to home on 11/19/2020 with current medications and treatments she will have PT OT home health aide and RN.  She is walking 80 feet and is able to stand for 6 minutes.  She is contact-guard for transfers.  She will be returning home where she lives with her .  Her incision is healing well.  Her blood pressure medications have been adjusted during her TCU stay.    She  Denies fever, chills,Denies any chest pain,headaches,lightheadedness, dizziness,   shortness of breath, or cough. Appetite is good. Denies any GERD symptoms.   Denies any difficulty with swallowing,Denies any abdominal pain, constipation or loose stools. No insomnia. No active bleeding. She increased  to 75% weight bearing status on 11/2/20.  Her pain is controlled.     Review of Systems  Constitutional: Positive for activity change.  Negative for appetite change, fatigue and fever.    75% weight bearing on 11/2/20   HENT: Negative for congestion.    Respiratory: Negative for cough, shortness of breath and wheezing.    Cardiovascular: Negative for chest pain and leg swelling.   Gastrointestinal: Negative for abdominal distention, abdominal pain, constipation, diarrhea and nausea.   Genitourinary: Negative for dysuria.   Musculoskeletal: Positive for arthralgias. Negative for back pain.   Skin: Positive for wound. Negative for color change.   Neurological: Negative for dizziness.   Psychiatric/Behavioral: Negative for agitation, behavioral problems and confusion.   Vitals:    11/18/20 1140   BP: 158/82   Pulse: 74   Resp: 18   Temp: 98.2  F (36.8  C)   SpO2: 95%   Weight: (!) 246 lb 1.6 oz (111.6 kg)       Physical Exam  Constitutional:       Appearance: She is well-developed.   HENT:      Head: Normocephalic.   Eyes:      Conjunctiva/sclera: Conjunctivae normal.   Neck:      Musculoskeletal: Normal range of motion.   Cardiovascular:      Rate and Rhythm: Normal rate and regular rhythm.      Heart sounds: Normal heart sounds. No murmur.   Pulmonary:      Effort: No respiratory distress.      Breath sounds: Normal breath sounds. No wheezing or rales.   Abdominal:      General: Bowel sounds are normal. There is no distension.      Palpations: Abdomen is soft.      Tenderness: There is no abdominal tenderness.   Genitourinary:   no issues reported.   Musculoskeletal:      Right lower leg: Edema present.      Comments: Decreased RLE   Skin:     General: Skin is warm.      Comments: 3 hip incisions right lateral thigh. Staples have been removed.  healing well.   Neurological:      Mental Status: She is alert and oriented to person, place, and time.   Psychiatric:         Behavior: Behavior normal.   MEDICATION LIST:  Current Outpatient Medications   Medication Sig   ? acetaminophen (TYLENOL) 500 MG tablet Take 1,000 mg by mouth every 8 (eight) hours as  needed for pain.   ? amLODIPine (NORVASC) 2.5 MG tablet Take 5 mg by mouth daily.    ? bisacodyL (DULCOLAX) 10 mg suppository Insert 1 suppository (10 mg total) into the rectum daily as needed.   ? cyclobenzaprine (FLEXERIL) 5 MG tablet Take 1 tablet (5 mg total) by mouth 3 (three) times a day as needed for muscle spasms.   ? melatonin 3 mg Tab tablet Take 1 tablet (3 mg total) by mouth at bedtime as needed.   ? metoprolol tartrate (LOPRESSOR) 25 MG tablet Take 100 mg by mouth 2 (two) times a day.    ? oxyCODONE (ROXICODONE) 5 MG immediate release tablet Take 1-2 tablets (5-10 mg total) by mouth every 4 (four) hours as needed (5mg for pain 5-7.  10mg for pain 8-10).   ? polyethylene glycol (MIRALAX) 17 gram packet Take 1 packet (17 g total) by mouth daily. Hold for 3 or more loose stools per day   ? rivaroxaban ANTICOAGULANT (XARELTO) 20 mg tablet Take 1 tablet (20 mg total) by mouth daily with supper.   ? senna-docusate (PERICOLACE) 8.6-50 mg tablet Take 2 tablets by mouth 2 (two) times a day. Hold for 3 or more loose stools per day       DISCHARGE DIAGNOSIS:    ICD-10-CM    1. Secondary hypertension  I15.9    2. Closed fracture of distal end of right femur with routine healing, unspecified fracture morphology, subsequent encounter  S72.401D    3. Acute blood loss anemia  D62        MEDICAL EQUIPMENT NEEDS  [facility] Supplied     DISCHARGE PLAN/FACE TO FACE:  I certify that services are/were furnished while this patient was under the care of a physician and that a physician or an allowed non-physician practitioner (NPP), had a face-to-face encounter that meets the physician face-to-face encounter requirements. The encounter was in whole, or in part, related to the primary reason for home health. The patient is confined to his/her home and needs intermittent skilled nursing, physical therapy, speech-language pathology, or the continued need for occupational therapy. A plan of care has been established by a physician  and is periodically reviewed by a physician.  Date of Face-to-Face Encounter: 11/18/20    I certify that, based on my findings, the following services are medically necessary home health services:PT/OT/HHA and RN     My clinical findings support the need for the above skilled services because: PT OT for continued strength and endurance following right femur fracture, home health aide to assist with activities of daily living, RN for vital signs, medication management.    This patient is homebound because: She is deconditioned and easily fatigued following right femur fracture.      The patient is, or has been, under my care and I have initiated the establishment of the plan of care. This patient will be followed by a physician who will periodically review the plan of care.    Schedule follow up visit with primary care provider within 7 days to reestablish care.    Electronically signed by: Jess Presley CNP

## 2021-06-21 NOTE — LETTER
Letter by Dana Roche FNP at      Author: Dana Roche FNP Service: -- Author Type: --    Filed:  Encounter Date: 2021 Status: (Other)         Jovana Clinton  : 1945        To Whom it May Concern:    Jovana Clinton sustained a femur fracture in 2020. As such, she is still recovering and is unable to safely ambulate to the mailbox to retrieve her mail at this time. Please deliver her mail to her doorstep for the next six months until 2021 at which time her ability will be re-evaluated.       Sincerely,    MAGDALENE Hart  21

## 2021-06-21 NOTE — LETTER
Letter by Jess Presley CNP at      Author: Jess Presley CNP Service: -- Author Type: --    Filed:  Encounter Date: 10/28/2020 Status: (Other)         Patient: Jovana Clinton   MR Number: [id number]   YOB: 1945   Date of Visit: 10/28/2020     [facility]    Facility:   [facility] [[id number]]   Code Status: DNR      CHIEF COMPLAINT/REASON FOR VISIT:  Chief Complaint   Patient presents with   ? Review Of Multiple Medical Conditions     F/U right hip surgery, Pain management        HISTORY:      HPI: Jovana is a 75 y.o. female undergoing physical and occupational therapy at [facility]. She is with past medical history of  Hypertension.  who presented after mechanical  fall at home. Right femur xray and CT right knee showed comminuted distal femoral shaft fracture. She underwent  ORIF right distal femur fracture using retrograde intramedullary nail. She also developed acute bilateral PE's and is on Xarelto.     She was hospitalized from 9/8-9/15/20.     Today she is seen to review blood pressure, right hip incision and F/U to vaginal bleeding. She reports the vaginal bleeding is intermittent and GYN consult pending.  He staples have been removed and her incision is healing well. Her blood pressures were reviewed and continue to be elevated.  She is currently on metoprolol and Norvasc 2.5 mg.  Her Norvasc was recently  increased to 5 mg.   She  Denies fever, chills,Denies any chest pain,headaches,lightheadedness, dizziness,   shortness of breath, or cough. Appetite is good. Denies any GERD symptoms.   Denies any difficulty with swallowing,Denies any abdominal pain, constipation or loose stools. No insomnia. No active bleeding. She continues to be TTWB and is now on a weight bearing taper. She will increase to 75% weight bearing status on 11/2/20.  Her pain is controlled.      Past Medical History:   Diagnosis Date   ? Secondary  hypertension 9/21/2020             Family History   Problem Relation Age of Onset   ? Heart disease Neg Hx      Social History     Socioeconomic History   ? Marital status:      Spouse name: Not on file   ? Number of children: Not on file   ? Years of education: Not on file   ? Highest education level: Not on file   Occupational History   ? Not on file   Social Needs   ? Financial resource strain: Not on file   ? Food insecurity     Worry: Not on file     Inability: Not on file   ? Transportation needs     Medical: Not on file     Non-medical: Not on file   Tobacco Use   ? Smoking status: Former Smoker   ? Smokeless tobacco: Never Used   Substance and Sexual Activity   ? Alcohol use: Yes     Alcohol/week: 1.0 standard drinks     Types: 1 Shots of liquor per week     Frequency: 2-4 times a month     Drinks per session: 1 or 2     Binge frequency: Never   ? Drug use: Never   ? Sexual activity: Not on file   Lifestyle   ? Physical activity     Days per week: Not on file     Minutes per session: Not on file   ? Stress: Not on file   Relationships   ? Social connections     Talks on phone: Not on file     Gets together: Not on file     Attends Denominational service: Not on file     Active member of club or organization: Not on file     Attends meetings of clubs or organizations: Not on file     Relationship status: Not on file   ? Intimate partner violence     Fear of current or ex partner: Not on file     Emotionally abused: Not on file     Physically abused: Not on file     Forced sexual activity: Not on file   Other Topics Concern   ? Incontinent Not Asked   ? Toileting independently Not Asked   ? Cognitive impairment Not Asked   ? Vision impairment Not Asked   ? Hearing impairment Not Asked   ? Dentures Not Asked   Social History Narrative   ? Not on file         Review of Systems   Constitutional: Positive for activity change. Negative for appetite change, fatigue and fever.        TTWB with a weight bearing  taper. She will increase to 75% weight bearing on 11/2/20   HENT: Negative for congestion.    Respiratory: Negative for cough, shortness of breath and wheezing.    Cardiovascular: Negative for chest pain and leg swelling.   Gastrointestinal: Negative for abdominal distention, abdominal pain, constipation, diarrhea and nausea.   Genitourinary: Negative for dysuria.   Musculoskeletal: Positive for arthralgias. Negative for back pain.   Skin: Positive for wound. Negative for color change.   Neurological: Negative for dizziness.   Psychiatric/Behavioral: Negative for agitation, behavioral problems and confusion.       Vitals:    10/28/20 0813   BP: (!) 161/94   Pulse: 70   Resp: 18   Temp: 98.4  F (36.9  C)   SpO2: 93%   Weight: (!) 244 lb (110.7 kg)       Physical Exam  Constitutional:       Appearance: She is well-developed.   HENT:      Head: Normocephalic.   Eyes:      Conjunctiva/sclera: Conjunctivae normal.   Neck:      Musculoskeletal: Normal range of motion.   Cardiovascular:      Rate and Rhythm: Normal rate and regular rhythm.      Heart sounds: Normal heart sounds. No murmur.   Pulmonary:      Effort: No respiratory distress.      Breath sounds: Normal breath sounds. No wheezing or rales.   Abdominal:      General: Bowel sounds are normal. There is no distension.      Palpations: Abdomen is soft.      Tenderness: There is no abdominal tenderness.   Genitourinary:     Comments: Vaginal bleeding noted  Musculoskeletal:      Right lower leg: Edema present.      Comments: Decreased RLE   Skin:     General: Skin is warm.      Comments: 3 hip incisions right lateral thigh. Staples have been removed.    Neurological:      Mental Status: She is alert and oriented to person, place, and time.   Psychiatric:         Behavior: Behavior normal.           LABS:   No results found for this or any previous visit (from the past 240 hour(s)).  Current Outpatient Medications   Medication Sig   ? acetaminophen (TYLENOL) 500 MG  tablet Take 1,000 mg by mouth every 8 (eight) hours as needed for pain.   ? amLODIPine (NORVASC) 2.5 MG tablet Take 5 mg by mouth daily.    ? bisacodyL (DULCOLAX) 10 mg suppository Insert 1 suppository (10 mg total) into the rectum daily as needed.   ? cyclobenzaprine (FLEXERIL) 5 MG tablet Take 1 tablet (5 mg total) by mouth 3 (three) times a day as needed for muscle spasms.   ? melatonin 3 mg Tab tablet Take 1 tablet (3 mg total) by mouth at bedtime as needed.   ? metoprolol tartrate (LOPRESSOR) 25 MG tablet Take 75 mg by mouth 2 (two) times a day.    ? oxyCODONE (ROXICODONE) 5 MG immediate release tablet Take 1-2 tablets (5-10 mg total) by mouth every 4 (four) hours as needed (5mg for pain 5-7.  10mg for pain 8-10).   ? polyethylene glycol (MIRALAX) 17 gram packet Take 1 packet (17 g total) by mouth daily. Hold for 3 or more loose stools per day   ? rivaroxaban ANTICOAGULANT (XARELTO) 20 mg tablet Take 1 tablet (20 mg total) by mouth daily with supper.   ? senna-docusate (PERICOLACE) 8.6-50 mg tablet Take 2 tablets by mouth 2 (two) times a day. Hold for 3 or more loose stools per day     ASSESSMENT:      ICD-10-CM    1. Closed fracture of distal end of right femur with routine healing, unspecified fracture morphology, subsequent encounter  S72.401D    2. Pain management  R52    3. Secondary hypertension  I15.9        PLAN:      Vaginal bleeding  GYN consult, reports intermittent     ORIF right femur PT OT, pain control,  follow-up with orthopedics as scheduled Pt can increase to 75% weight bearing on 11/2/20    Acute blood loss anemia  HGB 11.1 on 10/13. Up from hemoglobin 8.1 on 9/17/2020, on ferrous sulfate, monitor labs    Hypertension metoprolol tartrate to 25 mg twice daily,  Norvasc recently increased to 5 mg daily    Anticoagulation on Xarelto    Insomnia continue melatonin    Pain management on extra strength Tylenol and oxycodone as needed    Electronically signed by: Jess Presley CNP

## 2021-06-21 NOTE — LETTER
Letter by Kiara Mares MD at      Author: Kiara Mares MD Service: -- Author Type: --    Filed:  Encounter Date: 10/20/2020 Status: (Other)         Patient: Jovana Clinton   MR Number: [id number]   YOB: 1945   Date of Visit: 10/20/2020     [facility]    Facility:   [facility] [[id number]]   Code Status: FULL CODE       Chief Complaint   Patient presents with   ? Follow Up     TCU 10/20/2020. Right distal femur fracture. HTN.        TCU HPI:   Jovana is a 75 y.o. female who fell on 9/8/2020, tripped at home and presented to the hospital with a right distal femur fracture. Hospital info as partially excerpted below.     75 y.o. old female with no significant past medical history who presented after mechanical  fall at home. Right femur xray and CT right knee showed comminuted distal femoral shaft fracture 0n 9/8/2020.  Admitted for further management.     Acute bilateral PE: bilateral segmental/subsegmental, w/o RV strain, Hemodynamically stable, , troponins negative.  TTE EF 75%, nml rv size/function although not well visualized, no PHTN, no hemodynamically significant valve disease.  Precipitant likely DVT RLE in setting of right femur fracture.  -Hgb stable x24hrs with change from IV Heparin to Xarelto.  Would plan 6 months anticoagulation for provoked PE   -wean off O2 as able at TCU     Acute comminuted distal femoral shaft fracture:  POD#6 ORIF right distal femur fracture using retrograde intramedullary nail.  EBL 200ml intra-op.  Orthopedic surgery follow-up as advised.  PT/OT.       Acute postop right leg pain: controlled on current regimen.     Acute hypoxic respiratory failure:  Stable/improving.  2/2 bilateral PE and hypervolemia as well as not using I.S. and likely some atelectasis. COVID 9/8 and 9/14 negative.   -prn O2 and wean as able  -aggressive bronchial hygiene, I.S.     ABLA:  Hgb 15.3 (9/8)->9.7 (9/11)->7.9 9/12.   "8.1 on 9/13 post 1U PRBC.  Hgb 7.7 on discharge.  CBC at TCU as advised.  Overall stable over past 36hrs.  Hemodynamically stable.     Sinus tachycardia: improved/stable.  multifactorial and reactive from acute medical issues.      Uncomplicated UTI: UC pansensitive e. coli  -complete cefdinir as prescribed     Constipation  -miralax, senna, mobilize.      Leukocytosis: reactive. 13.8 on d/c.  procalcitonin negative. Afebrile.  Being treated for UTI on sensitive regimen, no PNA symptoms, negative COVID 9/8 and 9/14.  I presume it's related to known PE and most probable a RLE DVT which is already actively being treated.  No other s/s of infection elsewhere.  CBC at TCU as advised.  Aggressive I.S. for atelectasis.     Overall stabilized and discharged to TCU on 9/15/2020 for PT, OT, nursing cares, medical management and monitoring.       Today:  She saw ortho 9/29/2020, staples removed, continue TTWB, RTC 10/13/2020. There was an apparent mix up with the appt and it was not 10/13/2020 but was 10/15/2020. The referral form came back indicating \"stable appearing right distal femur fracture. Incisions healing well.\" Orders to begin sequential weight bearing starting with 25% on 10/19/2020, then 50% on 10/26/2020 and 75% on 11/2/2020. RTC 4 weeks. She started some WB yesterday per the protocol and reports a little achiness in knee but going okay. For BP, currently on amlodipine 5 mg and metoprolol 50 two times a day. Will increase metoprolol to 75 mg two times a day, BPs are better. Range of systolics 135-185 though usually 140-150s. HRs 70-80s, occ 90+. She had Renal US 9/28/2020 showing no evidence of BETTYE induced HTN. She has been asymptomatic, tolerating meds. She is on xarelto due to PE. No SOB or hypoxia, No chest pain or palpitations. She did have ABLA which required transfusion in the hospital. Last check on 10/13/2020 was 11.1. She completed 30 days of iron supplementation. She denies dizziness. Appetite is good. " No diarrhea or constipation. No urinary sx. No fever or cough.      Past Medical History:  No significant medical history.   Denies HTN, DM, CA, lung disease.      Medications:  Current Outpatient Medications   Medication Sig   ? acetaminophen (TYLENOL) 500 MG tablet Take 1,000 mg by mouth every 8 (eight) hours as needed for pain.   ? amLODIPine (NORVASC) 2.5 MG tablet Take 5 mg by mouth daily.    ? bisacodyL (DULCOLAX) 10 mg suppository Insert 1 suppository (10 mg total) into the rectum daily as needed.   ? cyclobenzaprine (FLEXERIL) 5 MG tablet Take 1 tablet (5 mg total) by mouth 3 (three) times a day as needed for muscle spasms.   ? melatonin 3 mg Tab tablet Take 1 tablet (3 mg total) by mouth at bedtime as needed.   ? metoprolol tartrate (LOPRESSOR) 25 MG tablet Take 50 mg by mouth 2 (two) times a day.    ? oxyCODONE (ROXICODONE) 5 MG immediate release tablet Take 1-2 tablets (5-10 mg total) by mouth every 4 (four) hours as needed (5mg for pain 5-7.  10mg for pain 8-10).   ? polyethylene glycol (MIRALAX) 17 gram packet Take 1 packet (17 g total) by mouth daily. Hold for 3 or more loose stools per day   ? rivaroxaban ANTICOAGULANT (XARELTO) 20 mg tablet Take 1 tablet (20 mg total) by mouth daily with supper.   ? senna-docusate (PERICOLACE) 8.6-50 mg tablet Take 2 tablets by mouth 2 (two) times a day. Hold for 3 or more loose stools per day       Physical Exam:   Note: COVID-19 pandemic precautions in place. Physical exam performed with social distancing considerations.  General: Patient is alert pleasant female, no distress.   Vitals: /79, 136/81, Temp 97.8, Pulse 73, RR 18, O2 sat 95%RA.  HEENT: Head is NCAT. Eyes show no injection or icterus. Nares negative. Oropharynx well hydrated.  Neck: Supple. No tenderness or adenopathy. No JVD.  Lungs: Non labored respirations.  Abdomen: Soft, no tenderness. No guarding rebound or rigidity.  : Deferred.  Extremities: Mild right knee swelling.   Skin: Surgical  incisions healed.  Psych: Mood appears good.      Labs:  Component      Latest Ref Rng & Units 9/8/2020 9/9/2020 9/10/2020 9/11/2020   WBC      4.0 - 11.0 thou/uL 17.6 (H) 10.2 8.2 11.7 (H)   RBC      3.80 - 5.40 mill/uL 5.09 4.18 3.38 (L) 3.24 (L)   Hemoglobin      12.0 - 16.0 g/dL 15.3 12.4 10.2 (L) 9.7 (L)   Hematocrit      35.0 - 47.0 % 45.8 37.4 31.6 (L) 30.0 (L)   MCV      80 - 100 fL 90 90 94 93   MCH      27.0 - 34.0 pg 30.1 29.7 30.2 29.9   MCHC      32.0 - 36.0 g/dL 33.4 33.2 32.3 32.3   RDW      11.0 - 14.5 % 13.6 13.8 14.0 14.0   Platelets      140 - 440 thou/uL 323 285 194 202   MPV      8.5 - 12.5 fL 10.5 10.3 10.4 10.9     Component      Latest Ref Rng & Units 9/12/2020 9/13/2020 9/14/2020 9/15/2020   WBC      4.0 - 11.0 thou/uL 11.3 (H) 11.6 (H) 13.0 (H) 13.8 (H)   RBC      3.80 - 5.40 mill/uL 2.63 (L) 2.65 (L) 2.65 (L) 2.55 (L)   Hemoglobin      12.0 - 16.0 g/dL 7.9 (L) 8.1 (L) 8.0 (L) 7.7 (L)   Hematocrit      35.0 - 47.0 % 24.2 (L) 24.1 (L) 24.4 (L) 23.7 (L)   MCV      80 - 100 fL 92 91 92 93   MCH      27.0 - 34.0 pg 30.0 30.6 30.2 30.2   MCHC      32.0 - 36.0 g/dL 32.6 33.6 32.8 32.5   RDW      11.0 - 14.5 % 14.0 14.2 14.6 (H) 15.0 (H)   Platelets      140 - 440 thou/uL 219 247 280 309   MPV      8.5 - 12.5 fL 11.0 10.7 10.3 10.3       Results for orders placed or performed during the hospital encounter of 09/08/20   Basic Metabolic Panel   Result Value Ref Range    Sodium 138 136 - 145 mmol/L    Potassium 4.0 3.5 - 5.0 mmol/L    Chloride 102 98 - 107 mmol/L    CO2 26 22 - 31 mmol/L    Anion Gap, Calculation 10 5 - 18 mmol/L    Glucose 110 70 - 125 mg/dL    Calcium 8.0 (L) 8.5 - 10.5 mg/dL    BUN 19 8 - 28 mg/dL    Creatinine 0.60 0.60 - 1.10 mg/dL    GFR MDRD Af Amer >60 >60 mL/min/1.73m2    GFR MDRD Non Af Amer >60 >60 mL/min/1.73m2     Lab Results   Component Value Date    HGB 8.1 (L) 09/17/2020     Lab Results   Component Value Date    WBC 12.2 (H) 09/17/2020    HGB 8.1 (L) 09/17/2020    HCT  25.3 (L) 09/17/2020    MCV 96 09/17/2020     (H) 09/17/2020     Lab Results   Component Value Date    HGB 11.1 (L) 10/13/2020       Renal US 9/28/2020 (PPX):  No BETTYE.   No hydronephrosis or shadowing calculi.      Assessment/Plan:  1. Right distal femur fracture. Sustained in a fall on 9/8/2020. S/p ORIF with intramedullary nail on 9/9/2020. TTWB. Ortho visits 9/29/2020, 10/15/2020. Started WB yesterday 10/19/2020 with 25% and will progress weekly from there. RTC ortho 4 weeks from last visit.   2. Bilateral post op acute PE. On xarelto x 6 months. No chest pain or hypoxia.   3. ABLA. Sec to surgery. Received transfusion. Last hgb on 10/13/2020 was 11.1. Completed 30 days of iron.  4. HTN. On metoprolol and amlodipine. Metoprolol increased today to 75 mg two times a day, continue 5 mg amlodipine.          Electronically signed by: Kiara Mares MD

## 2021-06-25 NOTE — TELEPHONE ENCOUNTER
Call patient: I cannot fill this medication until she follows up/establishes care in the office. The last visit that we did was a virtual visit and that was in December. Please schedule for an appointment.

## 2021-06-25 NOTE — TELEPHONE ENCOUNTER
Attempted to contact patient, who was not available.  Left message for her to call clinic back.  Wanting to assist in getting scheduled to see PCP and inform no refill until face to face visit.    Derek Landa RN  [facility].

## 2021-06-25 NOTE — TELEPHONE ENCOUNTER
Last Visit 12/22/2020-Dana Roche FNP  Notes:  1. Essential hypertension  - Recommend an office visit follow up to repeat BP check in 3-6 months. Patient is okay to establish care with me at this visit   - metoprolol tartrate (LOPRESSOR) 100 MG tablet; Take 1 tablet (100 mg total) by mouth 2 (two) times a day.  Dispense: 180 tablet; Refill: 1  - amLODIPine (NORVASC) 5 MG tablet; Take 1 tablet (5 mg total) by mouth daily.  Dispense: 90 tablet; Refill: 1     2. Closed fracture of distal end of right femur with routine healing, unspecified fracture morphology, subsequent encounter. S/p ORIF 9/2020  - doing well with home health PT. Managing pain with OTC medication     Last Filled:  amLODIPine (NORVASC) 5 MG tablet 90 tablet 1 12/22/2020  No   Sig - Route: Take 1 tablet (5 mg total) by mouth daily. - Oral   Sent to pharmacy as: amLODIPine 5 mg tablet (NORVASC)   E-Prescribing Status: Receipt confirmed by pharmacy (12/22/2020  2:07 PM CST)       Next OV:  Visit date not found        Medication teed up for provider signature

## 2021-06-25 NOTE — TELEPHONE ENCOUNTER
Refill Request  Did you contact pharmacy: No  Medication name:   Requested Prescriptions     Pending Prescriptions Disp Refills     amLODIPine (NORVASC) 5 MG tablet 90 tablet 3     Sig: Take 1 tablet (5 mg total) by mouth daily.     Who prescribed the medication: Dana Roche  Requested [facility]  Is patient out of medication: No.  7 days left  Patient notified refills processed in 3 business days:  yes  Okay to leave a detailed message: yes

## 2021-06-26 ENCOUNTER — HEALTH MAINTENANCE LETTER (OUTPATIENT)
Age: 76
End: 2021-06-26

## 2021-06-26 NOTE — TELEPHONE ENCOUNTER
Called and relayed message to patient. She states that she is using a walker and it is hard for her to get out right now. Refused to schedule at this time and stated that she would call when she is able to come in.

## 2021-06-26 NOTE — TELEPHONE ENCOUNTER
Call pt: she needs to be seen for a [facility] office visit. I cannot refill this medication until she is seen.

## 2021-07-14 PROBLEM — I15.9 SECONDARY HYPERTENSION: Status: RESOLVED | Noted: 2020-09-21 | Resolved: 2020-12-23

## 2021-08-03 PROBLEM — S72.451A: Status: RESOLVED | Noted: 2020-09-08 | Resolved: 2020-12-23

## 2021-08-03 PROBLEM — S72.351A: Status: RESOLVED | Noted: 2020-09-08 | Resolved: 2020-12-23

## 2021-10-24 ENCOUNTER — HEALTH MAINTENANCE LETTER (OUTPATIENT)
Age: 76
End: 2021-10-24

## 2022-07-31 ENCOUNTER — HEALTH MAINTENANCE LETTER (OUTPATIENT)
Age: 77
End: 2022-07-31

## 2022-10-16 ENCOUNTER — HEALTH MAINTENANCE LETTER (OUTPATIENT)
Age: 77
End: 2022-10-16

## 2023-08-26 ENCOUNTER — HEALTH MAINTENANCE LETTER (OUTPATIENT)
Age: 78
End: 2023-08-26